# Patient Record
Sex: MALE | Race: WHITE | Employment: OTHER | URBAN - METROPOLITAN AREA
[De-identification: names, ages, dates, MRNs, and addresses within clinical notes are randomized per-mention and may not be internally consistent; named-entity substitution may affect disease eponyms.]

---

## 2017-05-11 ENCOUNTER — OFFICE VISIT (OUTPATIENT)
Dept: FAMILY MEDICINE CLINIC | Age: 67
End: 2017-05-11

## 2017-05-11 VITALS
HEIGHT: 69 IN | RESPIRATION RATE: 16 BRPM | HEART RATE: 68 BPM | DIASTOLIC BLOOD PRESSURE: 80 MMHG | BODY MASS INDEX: 29.24 KG/M2 | WEIGHT: 197.4 LBS | SYSTOLIC BLOOD PRESSURE: 136 MMHG

## 2017-05-11 DIAGNOSIS — E03.9 HYPOTHYROIDISM, UNSPECIFIED TYPE: ICD-10-CM

## 2017-05-11 DIAGNOSIS — Z98.890 S/P MVR (MITRAL VALVE REPAIR): ICD-10-CM

## 2017-05-11 DIAGNOSIS — Z12.11 SCREEN FOR COLON CANCER: ICD-10-CM

## 2017-05-11 DIAGNOSIS — E78.5 HYPERLIPIDEMIA, UNSPECIFIED HYPERLIPIDEMIA TYPE: ICD-10-CM

## 2017-05-11 DIAGNOSIS — I10 ESSENTIAL HYPERTENSION: ICD-10-CM

## 2017-05-11 DIAGNOSIS — Z23 NEED FOR VACCINATION WITH 13-POLYVALENT PNEUMOCOCCAL CONJUGATE VACCINE: ICD-10-CM

## 2017-05-11 DIAGNOSIS — Z12.5 SCREENING FOR PROSTATE CANCER: ICD-10-CM

## 2017-05-11 DIAGNOSIS — Z90.5 HISTORY OF NEPHRECTOMY: ICD-10-CM

## 2017-05-11 DIAGNOSIS — R73.01 IFG (IMPAIRED FASTING GLUCOSE): ICD-10-CM

## 2017-05-11 DIAGNOSIS — B02.29 PHN (POSTHERPETIC NEURALGIA): Primary | ICD-10-CM

## 2017-05-11 PROCEDURE — 3017F COLORECTAL CA SCREEN DOC REV: CPT | Performed by: FAMILY MEDICINE

## 2017-05-11 PROCEDURE — 90670 PCV13 VACCINE IM: CPT | Performed by: FAMILY MEDICINE

## 2017-05-11 PROCEDURE — 99214 OFFICE O/P EST MOD 30 MIN: CPT | Performed by: FAMILY MEDICINE

## 2017-05-11 PROCEDURE — 1123F ACP DISCUSS/DSCN MKR DOCD: CPT | Performed by: FAMILY MEDICINE

## 2017-05-11 PROCEDURE — G8420 CALC BMI NORM PARAMETERS: HCPCS | Performed by: FAMILY MEDICINE

## 2017-05-11 PROCEDURE — G0009 ADMIN PNEUMOCOCCAL VACCINE: HCPCS | Performed by: FAMILY MEDICINE

## 2017-05-11 PROCEDURE — 4040F PNEUMOC VAC/ADMIN/RCVD: CPT | Performed by: FAMILY MEDICINE

## 2017-05-11 PROCEDURE — 1036F TOBACCO NON-USER: CPT | Performed by: FAMILY MEDICINE

## 2017-05-11 PROCEDURE — G8427 DOCREV CUR MEDS BY ELIG CLIN: HCPCS | Performed by: FAMILY MEDICINE

## 2017-05-11 ASSESSMENT — ENCOUNTER SYMPTOMS
GASTROINTESTINAL NEGATIVE: 1
RESPIRATORY NEGATIVE: 1

## 2017-05-11 ASSESSMENT — PATIENT HEALTH QUESTIONNAIRE - PHQ9
2. FEELING DOWN, DEPRESSED OR HOPELESS: 0
SUM OF ALL RESPONSES TO PHQ9 QUESTIONS 1 & 2: 0
1. LITTLE INTEREST OR PLEASURE IN DOING THINGS: 0
SUM OF ALL RESPONSES TO PHQ QUESTIONS 1-9: 0

## 2017-05-16 DIAGNOSIS — E03.8 OTHER SPECIFIED HYPOTHYROIDISM: Primary | ICD-10-CM

## 2017-05-16 RX ORDER — LEVOTHYROXINE SODIUM 100 MCG
100 TABLET ORAL DAILY
Qty: 90 TABLET | Refills: 0 | Status: SHIPPED | OUTPATIENT
Start: 2017-05-16 | End: 2017-05-16

## 2017-06-09 RX ORDER — LEVOTHYROXINE SODIUM 88 MCG
TABLET ORAL
Qty: 90 TABLET | Refills: 2 | Status: SHIPPED | OUTPATIENT
Start: 2017-06-09 | End: 2018-03-06 | Stop reason: SDUPTHER

## 2017-09-01 RX ORDER — AMLODIPINE BESYLATE 2.5 MG/1
TABLET ORAL
Qty: 90 TABLET | Refills: 3 | Status: SHIPPED | OUTPATIENT
Start: 2017-09-01 | End: 2018-08-27 | Stop reason: SDUPTHER

## 2017-09-08 ENCOUNTER — OFFICE VISIT (OUTPATIENT)
Dept: FAMILY MEDICINE CLINIC | Age: 67
End: 2017-09-08
Payer: MEDICARE

## 2017-09-08 VITALS
BODY MASS INDEX: 28.29 KG/M2 | RESPIRATION RATE: 20 BRPM | SYSTOLIC BLOOD PRESSURE: 128 MMHG | DIASTOLIC BLOOD PRESSURE: 68 MMHG | TEMPERATURE: 97.8 F | WEIGHT: 191 LBS | HEART RATE: 76 BPM | HEIGHT: 69 IN

## 2017-09-08 DIAGNOSIS — J06.9 ACUTE URI: Primary | ICD-10-CM

## 2017-09-08 PROCEDURE — 1123F ACP DISCUSS/DSCN MKR DOCD: CPT | Performed by: FAMILY MEDICINE

## 2017-09-08 PROCEDURE — 99213 OFFICE O/P EST LOW 20 MIN: CPT | Performed by: FAMILY MEDICINE

## 2017-09-08 PROCEDURE — 4040F PNEUMOC VAC/ADMIN/RCVD: CPT | Performed by: FAMILY MEDICINE

## 2017-09-08 PROCEDURE — 1036F TOBACCO NON-USER: CPT | Performed by: FAMILY MEDICINE

## 2017-09-08 PROCEDURE — G8417 CALC BMI ABV UP PARAM F/U: HCPCS | Performed by: FAMILY MEDICINE

## 2017-09-08 PROCEDURE — 3017F COLORECTAL CA SCREEN DOC REV: CPT | Performed by: FAMILY MEDICINE

## 2017-09-08 PROCEDURE — G8427 DOCREV CUR MEDS BY ELIG CLIN: HCPCS | Performed by: FAMILY MEDICINE

## 2017-09-08 RX ORDER — BENZONATATE 100 MG/1
100 CAPSULE ORAL 3 TIMES DAILY PRN
Qty: 20 CAPSULE | Refills: 0 | Status: SHIPPED | OUTPATIENT
Start: 2017-09-08 | End: 2018-06-25 | Stop reason: ALTCHOICE

## 2017-09-08 RX ORDER — HYDROCODONE POLISTIREX AND CHLORPHENIRAMINE POLISTIREX 10; 8 MG/5ML; MG/5ML
5 SUSPENSION, EXTENDED RELEASE ORAL EVERY 12 HOURS PRN
Qty: 115 ML | Refills: 0 | Status: SHIPPED | OUTPATIENT
Start: 2017-09-08 | End: 2017-12-19

## 2017-09-08 ASSESSMENT — ENCOUNTER SYMPTOMS
COUGH: 1
RHINORRHEA: 1
SHORTNESS OF BREATH: 0
WHEEZING: 0
SINUS PRESSURE: 0
GASTROINTESTINAL NEGATIVE: 1

## 2017-11-13 RX ORDER — ATORVASTATIN CALCIUM 10 MG/1
TABLET, FILM COATED ORAL
Qty: 90 TABLET | Refills: 3 | Status: SHIPPED | OUTPATIENT
Start: 2017-11-13 | End: 2018-11-08 | Stop reason: SDUPTHER

## 2017-11-13 RX ORDER — FENOFIBRATE 145 MG/1
TABLET, COATED ORAL
Qty: 90 TABLET | Refills: 3 | Status: SHIPPED | OUTPATIENT
Start: 2017-11-13 | End: 2018-11-08 | Stop reason: SDUPTHER

## 2017-11-28 ENCOUNTER — HOSPITAL ENCOUNTER (OUTPATIENT)
Age: 67
Discharge: HOME | End: 2017-11-28
Payer: MEDICARE

## 2017-11-28 ENCOUNTER — TELEPHONE (OUTPATIENT)
Dept: CARDIOLOGY CLINIC | Age: 67
End: 2017-11-28

## 2017-11-28 ENCOUNTER — OFFICE VISIT (OUTPATIENT)
Dept: SURGERY | Age: 67
End: 2017-11-28
Payer: MEDICARE

## 2017-11-28 VITALS
HEART RATE: 76 BPM | OXYGEN SATURATION: 94 % | TEMPERATURE: 96.4 F | HEIGHT: 70 IN | WEIGHT: 185 LBS | DIASTOLIC BLOOD PRESSURE: 82 MMHG | SYSTOLIC BLOOD PRESSURE: 130 MMHG | BODY MASS INDEX: 26.48 KG/M2 | RESPIRATION RATE: 16 BRPM

## 2017-11-28 DIAGNOSIS — R19.09 GROIN SWELLING: ICD-10-CM

## 2017-11-28 DIAGNOSIS — I10 ESSENTIAL HYPERTENSION: ICD-10-CM

## 2017-11-28 DIAGNOSIS — Z98.890 S/P MVR (MITRAL VALVE REPAIR): ICD-10-CM

## 2017-11-28 DIAGNOSIS — I10 ESSENTIAL HYPERTENSION: Primary | ICD-10-CM

## 2017-11-28 DIAGNOSIS — Z01.818 PREOPERATIVE CLEARANCE: ICD-10-CM

## 2017-11-28 DIAGNOSIS — R10.30 INGUINAL PAIN, UNSPECIFIED LATERALITY: Primary | ICD-10-CM

## 2017-11-28 DIAGNOSIS — N50.89 SCROTAL MASS: ICD-10-CM

## 2017-11-28 DIAGNOSIS — Z01.818 PRE-OP TESTING: ICD-10-CM

## 2017-11-28 LAB
ANION GAP SERPL CALCULATED.3IONS-SCNC: 11 MEQ/L (ref 8–16)
BUN BLDV-MCNC: 21 MG/DL (ref 7–22)
CALCIUM SERPL-MCNC: 10 MG/DL (ref 8.5–10.5)
CHLORIDE BLD-SCNC: 99 MEQ/L (ref 98–111)
CO2: 31 MEQ/L (ref 23–33)
CREAT SERPL-MCNC: 1.2 MG/DL (ref 0.4–1.2)
EKG ATRIAL RATE: 71 BPM
EKG P AXIS: 50 DEGREES
EKG P-R INTERVAL: 192 MS
EKG Q-T INTERVAL: 380 MS
EKG QRS DURATION: 112 MS
EKG QTC CALCULATION (BAZETT): 412 MS
EKG R AXIS: -33 DEGREES
EKG T AXIS: 37 DEGREES
EKG VENTRICULAR RATE: 71 BPM
GFR SERPL CREATININE-BSD FRML MDRD: 60 ML/MIN/1.73M2
GLUCOSE BLD-MCNC: 91 MG/DL (ref 70–108)
HCT VFR BLD CALC: 48.1 % (ref 42–52)
HEMOGLOBIN: 16.1 GM/DL (ref 14–18)
POTASSIUM SERPL-SCNC: 4.7 MEQ/L (ref 3.5–5.2)
SODIUM BLD-SCNC: 141 MEQ/L (ref 135–145)

## 2017-11-28 PROCEDURE — G8484 FLU IMMUNIZE NO ADMIN: HCPCS | Performed by: SURGERY

## 2017-11-28 PROCEDURE — 1036F TOBACCO NON-USER: CPT | Performed by: SURGERY

## 2017-11-28 PROCEDURE — 1123F ACP DISCUSS/DSCN MKR DOCD: CPT | Performed by: SURGERY

## 2017-11-28 PROCEDURE — G8417 CALC BMI ABV UP PARAM F/U: HCPCS | Performed by: SURGERY

## 2017-11-28 PROCEDURE — 36415 COLL VENOUS BLD VENIPUNCTURE: CPT

## 2017-11-28 PROCEDURE — 80048 BASIC METABOLIC PNL TOTAL CA: CPT

## 2017-11-28 PROCEDURE — 99214 OFFICE O/P EST MOD 30 MIN: CPT | Performed by: SURGERY

## 2017-11-28 PROCEDURE — G8427 DOCREV CUR MEDS BY ELIG CLIN: HCPCS | Performed by: SURGERY

## 2017-11-28 PROCEDURE — 93005 ELECTROCARDIOGRAM TRACING: CPT

## 2017-11-28 PROCEDURE — 4040F PNEUMOC VAC/ADMIN/RCVD: CPT | Performed by: SURGERY

## 2017-11-28 PROCEDURE — 85018 HEMOGLOBIN: CPT

## 2017-11-28 PROCEDURE — 3017F COLORECTAL CA SCREEN DOC REV: CPT | Performed by: SURGERY

## 2017-11-28 PROCEDURE — 85014 HEMATOCRIT: CPT

## 2017-11-29 ASSESSMENT — ENCOUNTER SYMPTOMS
ABDOMINAL DISTENTION: 0
SHORTNESS OF BREATH: 0
CHOKING: 0
COUGH: 0
STRIDOR: 0
ABDOMINAL PAIN: 0
SINUS PRESSURE: 0
COLOR CHANGE: 0
EYE PAIN: 0
CHEST TIGHTNESS: 0
PHOTOPHOBIA: 0
APNEA: 0
CONSTIPATION: 0
WHEEZING: 0
VOICE CHANGE: 0
EYE DISCHARGE: 0
ANAL BLEEDING: 0
FACIAL SWELLING: 0
SINUS PAIN: 0
BACK PAIN: 0
RHINORRHEA: 0
TROUBLE SWALLOWING: 0
EYE REDNESS: 0
EYE ITCHING: 0
BLOOD IN STOOL: 0
SORE THROAT: 0

## 2017-11-29 NOTE — PROGRESS NOTES
Skin: Negative for color change, pallor, rash and wound. Allergic/Immunologic: Negative for environmental allergies, food allergies and immunocompromised state. Neurological: Negative for dizziness, tremors, seizures, syncope, facial asymmetry, speech difficulty, weakness, light-headedness, numbness and headaches. Hematological: Negative for adenopathy. Does not bruise/bleed easily. Psychiatric/Behavioral: Negative for agitation, behavioral problems, confusion, decreased concentration, dysphoric mood, hallucinations, self-injury, sleep disturbance and suicidal ideas. The patient is not nervous/anxious and is not hyperactive. Objective:   /82 (Site: Left Arm, Position: Sitting, Cuff Size: Medium Adult)   Pulse 76   Temp 96.4 °F (35.8 °C) (Tympanic)   Resp 16   Ht 5' 10\" (1.778 m)   Wt 185 lb (83.9 kg)   SpO2 94%   BMI 26.54 kg/m²     Physical Exam   Constitutional: He is oriented to person, place, and time. He appears well-developed and well-nourished. HENT:   Head: Normocephalic and atraumatic. Eyes: Conjunctivae and EOM are normal. Pupils are equal, round, and reactive to light. Left eye exhibits no discharge. No scleral icterus. Neck: No tracheal deviation present. No thyromegaly present. Cardiovascular: Normal rate, regular rhythm and normal heart sounds. Exam reveals no gallop and no friction rub. No murmur heard. Pulmonary/Chest: Effort normal and breath sounds normal. No respiratory distress. He has no wheezes. He has no rales. He exhibits no tenderness. Abdominal: He exhibits no distension. There is no tenderness. There is no rebound and no guarding. Genitourinary:         Musculoskeletal: Normal range of motion. He exhibits no edema or tenderness. Lymphadenopathy:     He has no cervical adenopathy. Neurological: He is alert and oriented to person, place, and time. Skin: Skin is warm and dry. No rash noted. No erythema. No pallor.    Psychiatric: He has a normal mood and affect.  His behavior is normal. Judgment and thought content normal.       Results for orders placed or performed during the hospital encounter of 05/16/17   Comprehensive Metabolic Panel   Result Value Ref Range    Glucose 104 70 - 108 mg/dl    CREATININE 1.5 (H) 0.4 - 1.2 mg/dl    BUN 19 7 - 22 mg/dl    Sodium 141 135 - 145 meq/l    Potassium 4.3 3.5 - 5.2 meq/l    Chloride 100 98 - 111 meq/l    CO2 28 23 - 33 meq/l    Calcium 9.8 8.5 - 10.5 mg/dl    AST 34 5 - 40 U/L    Alkaline Phosphatase 92 38 - 126 U/L    Total Protein 7.6 6.1 - 8.0 gm/dl    Alb 4.3 3.5 - 5.1 gm/dl    Total Bilirubin 0.8 0.3 - 1.2 mg/dl    ALT 48 11 - 66 U/L   Lipid Panel   Result Value Ref Range    Cholesterol, Total 222 (H) 100 - 199 mg/dl    Triglycerides 223 (H) 0 - 199 mg/dl    HDL 43 mg/dl    LDL Calculated 134 mg/dl   Hemoglobin A1C   Result Value Ref Range    Hemoglobin A1C 6.2 4.4 - 6.4 %    AVERAGE GLUCOSE 126 70 - 126 mg/dl   TSH with Reflex   Result Value Ref Range    TSH 6.610 (H) 0.400 - 4.20 mcIU/ml   PSA, Prostatic Specific Antigen   Result Value Ref Range    PSA 0.39 0.00 - 2.50 ng/ml   CBC Auto Differential   Result Value Ref Range    WBC 6.3 4.8 - 10.8 thou/mm3    RBC 5.05 4.70 - 6.10 mill/mm3    Hemoglobin 15.3 14.0 - 18.0 gm/dl    Hematocrit 46.5 42.0 - 52.0 %    MCV 92.1 80.0 - 94.0 fL    MCH 30.2 27.0 - 31.0 pg    MCHC 32.8 (L) 33.0 - 37.0 gm/dl    RDW 14.1 11.5 - 14.5 %    Platelets 193 728 - 083 thou/mm3    MPV 10.2 7.4 - 10.4 mcm    RBC Morphology NORMAL     Seg Neutrophils 54.6 %    Lymphocytes 27.4 %    Monocytes 11.4 %    Eosinophils 6.0 %    Basophils 0.6 %    nRBC 0 /100 wbc    Segs Absolute 3.4 1.8 - 7.7 thou/mm3    Lymphocytes # 1.7 1.0 - 4.8 thou/mm3    Monocytes # 0.7 0.4 - 1.3 thou/mm3    Eosinophils # 0.4 0.0 - 0.4 thou/mm3    Basophils # 0.0 0.0 - 0.1 thou/mm3   Anion Gap   Result Value Ref Range    Anion Gap 13.0 8.0 - 16.0 meq/l   Glomerular Filtration Rate, Estimated   Result Value Ref Range    Est, Glom Filt Rate 46 (A) ml/min/1.73m2   T4, Free   Result Value Ref Range    T4 Free 1.19 0.93 - 1.76 ng/dl       Assessment:     Unusual mass of the left lower aspect of the scrotum of uncertain etiology it almost appears to be a lipoma but I never seen anything quite like this in this area it is clearly not a recurrent hernia however the patient is having some problems and would like to have it removed discussed with the patient that this would have to be removed under a general anesthetic he would like to proceed    Plan:     1. Schedule Drake for excision of left hoang-scrotal mass  2. The risks, benefits and alternatives were discussed with Angelica Chopra. He understands and wishes to proceed with surgical intervention. 3. Restrictions discussed with Angelica Chopra and he expresses understanding. 4. He is advised to call back directly if there are further questions/concerns, or if his symptoms worsen prior to surgery.             Electronically signed by Ham Del Rio MD on 11/29/2017 at 12:08 AM

## 2017-12-01 ENCOUNTER — HOSPITAL ENCOUNTER (OUTPATIENT)
Dept: NON INVASIVE DIAGNOSTICS | Age: 67
Discharge: HOME | End: 2017-12-01
Payer: MEDICARE

## 2017-12-01 VITALS — HEIGHT: 69 IN | BODY MASS INDEX: 27.4 KG/M2 | WEIGHT: 185 LBS

## 2017-12-01 DIAGNOSIS — Z01.818 PREOPERATIVE CLEARANCE: ICD-10-CM

## 2017-12-01 DIAGNOSIS — Z98.890 S/P MVR (MITRAL VALVE REPAIR): ICD-10-CM

## 2017-12-01 DIAGNOSIS — I10 ESSENTIAL HYPERTENSION: ICD-10-CM

## 2017-12-01 PROCEDURE — 6360000002 HC RX W HCPCS

## 2017-12-01 PROCEDURE — 78452 HT MUSCLE IMAGE SPECT MULT: CPT

## 2017-12-01 PROCEDURE — 93017 CV STRESS TEST TRACING ONLY: CPT | Performed by: INTERNAL MEDICINE

## 2017-12-01 PROCEDURE — A9500 TC99M SESTAMIBI: HCPCS | Performed by: INTERNAL MEDICINE

## 2017-12-01 PROCEDURE — 3430000000 HC RX DIAGNOSTIC RADIOPHARMACEUTICAL: Performed by: INTERNAL MEDICINE

## 2017-12-01 RX ADMIN — Medication 9.6 MILLICURIE: at 07:35

## 2017-12-01 RX ADMIN — Medication 33.2 MILLICURIE: at 08:40

## 2017-12-04 ENCOUNTER — TELEPHONE (OUTPATIENT)
Dept: SURGERY | Age: 67
End: 2017-12-04

## 2017-12-07 NOTE — PROGRESS NOTES
NPO after midnight  [employer] and drivers license  Wear comfortable clean clothing  Do not bring jewelry or valuables  Shower night before and morning of surgery with a liquid antibacterial soap  Bring list of medications with dosage and how often taken  Follow all instructions given by your physician   needed at discharge    In preparation for their surgical procedure above patient was screened for Obstructive Sleep Apnea (AMBER) using the STOP-Bang Questionnaire by the [place] [address] department. This is a pre-surgical screening tool for patient safety and serves as a recommendation, this WILL NOT cause cancellation of surgery. STOP-Bang Questionnaire  * Do you currently see a pulmonologist?  No     If yes STOP, do not complete. Patient follows with Dr. .    1. Do you snore loudly (able to be heard in the next room)? No    2. Do you often feel tired or sleepy during the daytime? No       3. Has anyone ever told you that you stop breathing during your sleep? No    4. Do you have or are you being treated for high blood pressure? Yes      5. BMI more than 35? BMI (Calculated): 27.4        No    6. Age over 48 years? 69 y.o. Yes    7. Neck Circumference greater than 17 inches for male or 16 inches for female? Measured           (visits only)            Not Applicable    8. Gender Male? Yes      TOTAL SCORE: 3    AMBER - Low Risk : Yes to 0 - 2 questions  AMBER - Intermediate Risk : Yes to 3 - 4 questions  AMBER - High Risk : Yes to 5 - 8 questions    Adapted from:   STOP Questionnaire: A Tool to Screen Patients for Obstructive Sleep Apnea   ROSELYN RodriguezC.P.C., ANABELA Caraballo.B.B.S., Jay Leos M.D., Segun Jansen. Jeet Recinos, Ph.D., Shawna Davila M.B.B.S., Morro Celaya, M.Sc., Art Tyson M.D., Gladys Burris. ALANA Lawson.P.C.    Anesthesiology 2008; 935:764-85 Copyright 2008, the [address] [employer] [place] [address].   ----------------------------------------------------------------------------------------------------------------

## 2017-12-08 ENCOUNTER — ANESTHESIA EVENT (OUTPATIENT)
Dept: OPERATING ROOM | Age: 67
End: 2017-12-08
Payer: MEDICARE

## 2017-12-08 ENCOUNTER — HOSPITAL ENCOUNTER (OUTPATIENT)
Age: 67
Discharge: HOME | End: 2017-12-08
Attending: SURGERY | Admitting: SURGERY
Payer: MEDICARE

## 2017-12-08 ENCOUNTER — ANESTHESIA (OUTPATIENT)
Dept: OPERATING ROOM | Age: 67
End: 2017-12-08
Payer: MEDICARE

## 2017-12-08 VITALS
SYSTOLIC BLOOD PRESSURE: 124 MMHG | BODY MASS INDEX: 28.11 KG/M2 | HEIGHT: 69 IN | OXYGEN SATURATION: 94 % | HEART RATE: 81 BPM | RESPIRATION RATE: 14 BRPM | TEMPERATURE: 98 F | WEIGHT: 189.8 LBS | DIASTOLIC BLOOD PRESSURE: 60 MMHG

## 2017-12-08 VITALS
TEMPERATURE: 97.7 F | DIASTOLIC BLOOD PRESSURE: 65 MMHG | OXYGEN SATURATION: 98 % | RESPIRATION RATE: 14 BRPM | SYSTOLIC BLOOD PRESSURE: 108 MMHG

## 2017-12-08 PROBLEM — N50.89 SCROTAL MASS: Status: ACTIVE | Noted: 2017-12-08

## 2017-12-08 PROCEDURE — 2580000003 HC RX 258

## 2017-12-08 PROCEDURE — 3700000000 HC ANESTHESIA ATTENDED CARE: Performed by: SURGERY

## 2017-12-08 PROCEDURE — 7100000011 HC PHASE II RECOVERY - ADDTL 15 MIN: Performed by: SURGERY

## 2017-12-08 PROCEDURE — 7100000010 HC PHASE II RECOVERY - FIRST 15 MIN: Performed by: SURGERY

## 2017-12-08 PROCEDURE — 7100000000 HC PACU RECOVERY - FIRST 15 MIN: Performed by: SURGERY

## 2017-12-08 PROCEDURE — 6360000002 HC RX W HCPCS: Performed by: NURSE ANESTHETIST, CERTIFIED REGISTERED

## 2017-12-08 PROCEDURE — 88304 TISSUE EXAM BY PATHOLOGIST: CPT

## 2017-12-08 PROCEDURE — 2500000003 HC RX 250 WO HCPCS: Performed by: SURGERY

## 2017-12-08 PROCEDURE — 7100000001 HC PACU RECOVERY - ADDTL 15 MIN: Performed by: SURGERY

## 2017-12-08 PROCEDURE — 3700000001 HC ADD 15 MINUTES (ANESTHESIA): Performed by: SURGERY

## 2017-12-08 PROCEDURE — 3600000012 HC SURGERY LEVEL 2 ADDTL 15MIN: Performed by: SURGERY

## 2017-12-08 PROCEDURE — 2500000003 HC RX 250 WO HCPCS: Performed by: NURSE ANESTHETIST, CERTIFIED REGISTERED

## 2017-12-08 PROCEDURE — 2580000003 HC RX 258: Performed by: NURSE ANESTHETIST, CERTIFIED REGISTERED

## 2017-12-08 PROCEDURE — 11426 EXC H-F-NK-SP B9+MARG >4 CM: CPT | Performed by: SURGERY

## 2017-12-08 PROCEDURE — 3600000002 HC SURGERY LEVEL 2 BASE: Performed by: SURGERY

## 2017-12-08 RX ORDER — PROPOFOL 10 MG/ML
INJECTION, EMULSION INTRAVENOUS PRN
Status: DISCONTINUED | OUTPATIENT
Start: 2017-12-08 | End: 2017-12-08 | Stop reason: SDUPTHER

## 2017-12-08 RX ORDER — ONDANSETRON 2 MG/ML
INJECTION INTRAMUSCULAR; INTRAVENOUS PRN
Status: DISCONTINUED | OUTPATIENT
Start: 2017-12-08 | End: 2017-12-08 | Stop reason: SDUPTHER

## 2017-12-08 RX ORDER — FENTANYL CITRATE 50 UG/ML
INJECTION, SOLUTION INTRAMUSCULAR; INTRAVENOUS PRN
Status: DISCONTINUED | OUTPATIENT
Start: 2017-12-08 | End: 2017-12-08 | Stop reason: SDUPTHER

## 2017-12-08 RX ORDER — DEXAMETHASONE SODIUM PHOSPHATE 4 MG/ML
INJECTION, SOLUTION INTRA-ARTICULAR; INTRALESIONAL; INTRAMUSCULAR; INTRAVENOUS; SOFT TISSUE PRN
Status: DISCONTINUED | OUTPATIENT
Start: 2017-12-08 | End: 2017-12-08 | Stop reason: SDUPTHER

## 2017-12-08 RX ORDER — OXYCODONE HYDROCHLORIDE AND ACETAMINOPHEN 5; 325 MG/1; MG/1
1 TABLET ORAL EVERY 6 HOURS PRN
Qty: 20 TABLET | Refills: 0 | Status: SHIPPED | OUTPATIENT
Start: 2017-12-08 | End: 2017-12-19

## 2017-12-08 RX ORDER — SODIUM CHLORIDE 9 MG/ML
INJECTION, SOLUTION INTRAVENOUS CONTINUOUS
Status: DISCONTINUED | OUTPATIENT
Start: 2017-12-08 | End: 2017-12-08 | Stop reason: HOSPADM

## 2017-12-08 RX ORDER — LIDOCAINE HYDROCHLORIDE 20 MG/ML
INJECTION, SOLUTION INFILTRATION; PERINEURAL PRN
Status: DISCONTINUED | OUTPATIENT
Start: 2017-12-08 | End: 2017-12-08 | Stop reason: SDUPTHER

## 2017-12-08 RX ORDER — BUPIVACAINE HYDROCHLORIDE AND EPINEPHRINE 5; 5 MG/ML; UG/ML
INJECTION, SOLUTION EPIDURAL; INTRACAUDAL; PERINEURAL PRN
Status: DISCONTINUED | OUTPATIENT
Start: 2017-12-08 | End: 2017-12-08 | Stop reason: HOSPADM

## 2017-12-08 RX ORDER — SODIUM CHLORIDE 9 MG/ML
INJECTION, SOLUTION INTRAVENOUS CONTINUOUS PRN
Status: DISCONTINUED | OUTPATIENT
Start: 2017-12-08 | End: 2017-12-08 | Stop reason: SDUPTHER

## 2017-12-08 RX ORDER — PROMETHAZINE HYDROCHLORIDE 25 MG/ML
12.5 INJECTION, SOLUTION INTRAMUSCULAR; INTRAVENOUS
Status: DISCONTINUED | OUTPATIENT
Start: 2017-12-08 | End: 2017-12-08 | Stop reason: HOSPADM

## 2017-12-08 RX ORDER — LABETALOL HYDROCHLORIDE 5 MG/ML
10 INJECTION, SOLUTION INTRAVENOUS EVERY 10 MIN PRN
Status: DISCONTINUED | OUTPATIENT
Start: 2017-12-08 | End: 2017-12-08 | Stop reason: HOSPADM

## 2017-12-08 RX ADMIN — SODIUM CHLORIDE: 9 INJECTION, SOLUTION INTRAVENOUS at 11:46

## 2017-12-08 RX ADMIN — DEXAMETHASONE SODIUM PHOSPHATE 4 MG: 4 INJECTION, SOLUTION INTRAMUSCULAR; INTRAVENOUS at 11:55

## 2017-12-08 RX ADMIN — FENTANYL CITRATE 50 MCG: 50 INJECTION INTRAMUSCULAR; INTRAVENOUS at 11:51

## 2017-12-08 RX ADMIN — FENTANYL CITRATE 50 MCG: 50 INJECTION INTRAMUSCULAR; INTRAVENOUS at 12:03

## 2017-12-08 RX ADMIN — PROPOFOL 150 MG: 10 INJECTION, EMULSION INTRAVENOUS at 11:51

## 2017-12-08 RX ADMIN — LIDOCAINE HYDROCHLORIDE 50 MG: 20 INJECTION, SOLUTION INFILTRATION; PERINEURAL at 11:51

## 2017-12-08 RX ADMIN — SODIUM CHLORIDE: 9 INJECTION, SOLUTION INTRAVENOUS at 10:26

## 2017-12-08 RX ADMIN — ONDANSETRON 4 MG: 2 INJECTION INTRAMUSCULAR; INTRAVENOUS at 12:25

## 2017-12-08 ASSESSMENT — PULMONARY FUNCTION TESTS
PIF_VALUE: 4
PIF_VALUE: 5
PIF_VALUE: 4
PIF_VALUE: 18
PIF_VALUE: 20
PIF_VALUE: 4
PIF_VALUE: 4
PIF_VALUE: 6
PIF_VALUE: 4
PIF_VALUE: 5
PIF_VALUE: 23
PIF_VALUE: 5
PIF_VALUE: 5
PIF_VALUE: 7
PIF_VALUE: 5
PIF_VALUE: 4
PIF_VALUE: 2
PIF_VALUE: 4
PIF_VALUE: 3
PIF_VALUE: 3
PIF_VALUE: 2
PIF_VALUE: 3
PIF_VALUE: 5
PIF_VALUE: 24
PIF_VALUE: 3
PIF_VALUE: 2
PIF_VALUE: 17
PIF_VALUE: 5
PIF_VALUE: 26
PIF_VALUE: 20
PIF_VALUE: 2
PIF_VALUE: 0
PIF_VALUE: 0
PIF_VALUE: 4
PIF_VALUE: 4
PIF_VALUE: 2
PIF_VALUE: 21
PIF_VALUE: 5
PIF_VALUE: 3
PIF_VALUE: 21
PIF_VALUE: 4
PIF_VALUE: 3

## 2017-12-08 ASSESSMENT — PAIN SCALES - GENERAL
PAINLEVEL_OUTOF10: 0
PAINLEVEL_OUTOF10: 4
PAINLEVEL_OUTOF10: 4

## 2017-12-08 ASSESSMENT — PAIN - FUNCTIONAL ASSESSMENT: PAIN_FUNCTIONAL_ASSESSMENT: 0-10

## 2017-12-08 NOTE — H&P
[facility] PROFESSIONAL SERVS  [facility]  [address]  [address] [address]  Dept: [phone]  Dept Fax: [phone]  Loc: [phone]    Visit Date: 11/28/2017    Kathi Hurtado is a 69 y.o. male who presents today for:  Chief Complaint   Patient presents with    Surgical Consult     Est patient-scrotal hernia        HPI:     HPI 76-year-old white male who appears younger than his age who has a history of right inguinal hernia repair ×1 and left in hernia repair ×2 patient is presenting today he faces a inguinal hernia recurrence were well down in his scrotum and is here for my evaluation he states it appears to be getting larger in size. He also admits it's been there for a few years really has no pain but discomfort and it is enlarged and is difficult when he walks he is here for my evaluation he denies any voiding abnormalities no pain in the true groin area    Past Medical History:   Diagnosis Date    CKD (chronic kidney disease) stage 3, GFR 30-59 ml/min     only one kidney     Hyperlipidemia     Hypertension     Mitral valve regurgitation     MOD-SVERE s/p valvuloplasty    Other sleep disturbances     Thyroid disease       Past Surgical History:   Procedure Laterality Date    CARDIAC CATHETERIZATION      CARDIOVASCULAR STRESS TEST  04/06/2010    no CP induced  by exercise. mild htn response to exercise. no arrhythmias. no EKG chg. no wall motion abnormalities. abnormal MV structure w/MR unchanged.     [facility]  [facility]    [facility]    CHOLECYSTECTOMY      COLONOSCOPY      DIAGNOSTIC CARDIAC CATH LAB PROCEDURE      ENDOSCOPY, COLON, DIAGNOSTIC      HERNIA REPAIR  1-23-09    [facility]- left indirect and direct inguinal hernia    HERNIA REPAIR  2-5-07    [facility]     INGUINAL HERNIA REPAIR Left 11/3/2014    left hernia repair w mesh    KIDNEY DONATION  2005    MITRAL VALVE SURGERY  11-9-12    repair    TRANSTHORACIC normal mood and affect.  His behavior is normal. Judgment and thought content normal.       Results for orders placed or performed during the hospital encounter of 05/16/17   Comprehensive Metabolic Panel   Result Value Ref Range    Glucose 104 70 - 108 mg/dl    CREATININE 1.5 (H) 0.4 - 1.2 mg/dl    BUN 19 7 - 22 mg/dl    Sodium 141 135 - 145 meq/l    Potassium 4.3 3.5 - 5.2 meq/l    Chloride 100 98 - 111 meq/l    CO2 28 23 - 33 meq/l    Calcium 9.8 8.5 - 10.5 mg/dl    AST 34 5 - 40 U/L    Alkaline Phosphatase 92 38 - 126 U/L    Total Protein 7.6 6.1 - 8.0 gm/dl    Alb 4.3 3.5 - 5.1 gm/dl    Total Bilirubin 0.8 0.3 - 1.2 mg/dl    ALT 48 11 - 66 U/L   Lipid Panel   Result Value Ref Range    Cholesterol, Total 222 (H) 100 - 199 mg/dl    Triglycerides 223 (H) 0 - 199 mg/dl    HDL 43 mg/dl    LDL Calculated 134 mg/dl   Hemoglobin A1C   Result Value Ref Range    Hemoglobin A1C 6.2 4.4 - 6.4 %    AVERAGE GLUCOSE 126 70 - 126 mg/dl   TSH with Reflex   Result Value Ref Range    TSH 6.610 (H) 0.400 - 4.20 mcIU/ml   PSA, Prostatic Specific Antigen   Result Value Ref Range    PSA 0.39 0.00 - 2.50 ng/ml   CBC Auto Differential   Result Value Ref Range    WBC 6.3 4.8 - 10.8 thou/mm3    RBC 5.05 4.70 - 6.10 mill/mm3    Hemoglobin 15.3 14.0 - 18.0 gm/dl    Hematocrit 46.5 42.0 - 52.0 %    MCV 92.1 80.0 - 94.0 fL    MCH 30.2 27.0 - 31.0 pg    MCHC 32.8 (L) 33.0 - 37.0 gm/dl    RDW 14.1 11.5 - 14.5 %    Platelets 525 784 - 767 thou/mm3    MPV 10.2 7.4 - 10.4 mcm    RBC Morphology NORMAL     Seg Neutrophils 54.6 %    Lymphocytes 27.4 %    Monocytes 11.4 %    Eosinophils 6.0 %    Basophils 0.6 %    nRBC 0 /100 wbc    Segs Absolute 3.4 1.8 - 7.7 thou/mm3    Lymphocytes # 1.7 1.0 - 4.8 thou/mm3    Monocytes # 0.7 0.4 - 1.3 thou/mm3    Eosinophils # 0.4 0.0 - 0.4 thou/mm3    Basophils # 0.0 0.0 - 0.1 thou/mm3   Anion Gap   Result Value Ref Range    Anion Gap 13.0 8.0 - 16.0 meq/l   Glomerular Filtration Rate, Estimated   Result Value Ref Range    Est, Glom Filt Rate 46 (A) ml/min/1.73m2   T4, Free   Result Value Ref Range    T4 Free 1.19 0.93 - 1.76 ng/dl       Assessment:     Unusual mass of the left lower aspect of the scrotum of uncertain etiology it almost appears to be a lipoma but I never seen anything quite like this in this area it is clearly not a recurrent hernia however the patient is having some problems and would like to have it removed discussed with the patient that this would have to be removed under a general anesthetic he would like to proceed    Plan:     1. Schedule Drake for excision of left hoang-scrotal mass  2. The risks, benefits and alternatives were discussed with Franci Levine. He understands and wishes to proceed with surgical intervention. 3. Restrictions discussed with Franci Levine and he expresses understanding. 4. He is advised to call back directly if there are further questions/concerns, or if his symptoms worsen prior to surgery.             Electronically signed by Benito Recinos MD on 11/29/2017 at 12:08 AM

## 2017-12-08 NOTE — ANESTHESIA POSTPROCEDURE EVALUATION
complications    Additional Follow-Up / Treatment / Comment:  None    Parmjit Rayo DO  December 8, 2017   12:58 PM

## 2017-12-08 NOTE — ANESTHESIA PRE PROCEDURE
Department of Anesthesiology  Preprocedure Note       Name:  Laura Krishnamurthy   Age:  69 y.o.  :  1950                                          MRN:  [id number]         Date:  2017      Surgeon: Michael Weaver):  Dorothea Victor MD    Procedure: Procedure(s):  EXCISION OF LEFT DOTTIE SCROTAL MASS    Medications prior to admission:   Prior to Admission medications    Medication Sig Start Date End Date Taking? Authorizing Provider   atorvastatin (LIPITOR) 10 MG tablet TAKE 1 TABLET DAILY 17  Yes Kiet Waite, DO   fenofibrate (TRICOR) 145 MG tablet TAKE 1 TABLET DAILY 17  Yes Kiet Waite, DO   benzonatate (TESSALON PERLES) 100 MG capsule Take 1 capsule by mouth 3 times daily as needed for Cough 17  Yes Kiet Waite, DO   amLODIPine (NORVASC) 2.5 MG tablet TAKE 1 TABLET DAILY 17  Yes Kiet Waite, DO   SYNTHROID 88 MCG tablet TAKE 1 TABLET DAILY WITH WATER ON AN EMPTY STOMACH. WAIT 30 MINUTES BEFORE EATING OR TAKING OTHER MEDICATIONS. 17  Yes Kiet Waite, DO   pantoprazole (PROTONIX) 40 MG tablet Take 1 tablet by mouth 2 times daily (before meals) 7/8/15  Yes Kiet Waite, DO   hydrocodone-chlorpheniramine [facility]) 10-8 MG/5ML SUER Take 5 mLs by mouth every 12 hours as needed (cough) . 17   Kiet Waite, DO   gabapentin (NEURONTIN) 800 MG tablet Take 1 tablet by mouth 3 times daily 16   Kiet Waite, DO   aspirin EC 81 MG EC tablet Take 1 tablet by mouth daily 7/8/15   Kiet Waite, DO       Current medications:    Current Facility-Administered Medications   Medication Dose Route Frequency Provider Last Rate Last Dose    0.9 % sodium chloride infusion   Intravenous Continuous Laura Edward RADHA 20 mL/hr at  1026         Allergies:     Allergies   Allergen Reactions    Tape Beau Regulus Tape] Dermatitis     Plastic tape       Problem List:    Patient Active Problem List   Diagnosis Code

## 2017-12-08 NOTE — PROGRESS NOTES
Pt ambulates to bathroom and urinates without difficulty. States pain is tolerable and he is ready to go home.

## 2017-12-09 NOTE — OP NOTE
Hemostasis was obtained with electrocautery. Interrupted 3-0 Vicryl was used to close the subcutaneous and staples were  used to close the skin. The patient tolerated the procedure well. DARIO FOX [facility] TREATMENT [facility], MD    D: 12/08/2017 13:15:29       T: 12/08/2017 13:18:00     [email or web address]  Job#: [id number]     Doc#: [id number]    CC:

## 2017-12-11 ENCOUNTER — TELEPHONE (OUTPATIENT)
Dept: SURGERY | Age: 67
End: 2017-12-11

## 2017-12-11 ENCOUNTER — TELEPHONE (OUTPATIENT)
Dept: FAMILY MEDICINE CLINIC | Age: 67
End: 2017-12-11

## 2017-12-19 ENCOUNTER — OFFICE VISIT (OUTPATIENT)
Dept: SURGERY | Age: 67
End: 2017-12-19

## 2017-12-19 VITALS
WEIGHT: 188.3 LBS | SYSTOLIC BLOOD PRESSURE: 140 MMHG | RESPIRATION RATE: 18 BRPM | HEART RATE: 85 BPM | TEMPERATURE: 96.8 F | DIASTOLIC BLOOD PRESSURE: 78 MMHG | HEIGHT: 70 IN | BODY MASS INDEX: 26.96 KG/M2 | OXYGEN SATURATION: 96 %

## 2017-12-19 DIAGNOSIS — Z98.890 S/P EXCISION OF LIPOMA: Primary | ICD-10-CM

## 2017-12-19 DIAGNOSIS — Z48.02 REMOVAL OF STAPLES: ICD-10-CM

## 2017-12-19 DIAGNOSIS — Z86.018 S/P EXCISION OF LIPOMA: Primary | ICD-10-CM

## 2017-12-19 PROCEDURE — 99024 POSTOP FOLLOW-UP VISIT: CPT | Performed by: NURSE PRACTITIONER

## 2017-12-19 ASSESSMENT — ENCOUNTER SYMPTOMS
PHOTOPHOBIA: 0
EYE DISCHARGE: 0
VOICE CHANGE: 0
EYE PAIN: 0
SORE THROAT: 0
BACK PAIN: 0
CHEST TIGHTNESS: 0
SINUS PRESSURE: 0
COLOR CHANGE: 0
SHORTNESS OF BREATH: 0
ANAL BLEEDING: 0
CHOKING: 0
NAUSEA: 0
EYE ITCHING: 0
VOMITING: 0
STRIDOR: 0
WHEEZING: 0
EYE REDNESS: 0
DIARRHEA: 0
RHINORRHEA: 0
FACIAL SWELLING: 0
TROUBLE SWALLOWING: 0
RECTAL PAIN: 0
CONSTIPATION: 0
COUGH: 0
ABDOMINAL PAIN: 0
APNEA: 0
ABDOMINAL DISTENTION: 0
BLOOD IN STOOL: 0

## 2017-12-19 NOTE — PROGRESS NOTES
[facility] PROFESSIONAL SERVS  [facility]  [address]  [address] [address]  Dept: [phone]  Dept Fax: [phone]  Loc: [phone]    Visit Date: 12/19/2017       Antony Holliday is a 69 y.o. male who presents today for:  Chief Complaint   Patient presents with    Post-Op Check     S/p Excision L Scrotal Lipoma 12/8/17       HPI:     Aris Neal presents today for a 11 day  post op check. Today He has no complaints at this time. He states very little pain since surgery. He has significant swelling in the scrotal sac. He has a rather large seroma in the scrotal sac. The fluid was hemolyzed blood and clear. The skin was prepped with betadine, a 18 gauge needle was inserted through the suture line with a 60ml syringe. Appx another 60ml oozed out of scrotum. Partial staples removed. Tolerated well. Pathology was reviewed and discussed. The patient is leaving for [place] for 4 months next week. He will need to find a provider or urgent care clinic to remove the remainder of the staples after January 1. Would like a follow up appointment when he returns from [place] in April. Past Medical History:   Diagnosis Date    CKD (chronic kidney disease) stage 3, GFR 30-59 ml/min     only one kidney     Hyperlipidemia     Hypertension     Mitral valve regurgitation     MOD-SVERE s/p valvuloplasty    Other sleep disturbances     Thyroid disease       Past Surgical History:   Procedure Laterality Date    CARDIAC CATHETERIZATION      CARDIOVASCULAR STRESS TEST  04/06/2010    no CP induced  by exercise. mild htn response to exercise. no arrhythmias. no EKG chg. no wall motion abnormalities. abnormal MV structure w/MR unchanged.     [facility]    CHOLECYSTECTOMY      COLONOSCOPY      DIAGNOSTIC CARDIAC CATH LAB PROCEDURE      DISSECTION GROIN N/A 12/8/2017    EXCISION OF LEFT DOTTIE SCROTAL MASS performed by Fam Taylor MD at [place] [facility]  ENDOSCOPY, COLON, DIAGNOSTIC      HERNIA REPAIR  1-23-09    [facility]- left indirect and direct inguinal hernia    HERNIA REPAIR  2-5-07    Right inguinal hernia- [facility]     INGUINAL HERNIA REPAIR Left 11/3/2014    left hernia repair w mesh    KIDNEY DONATION  2005    MITRAL VALVE SURGERY  11-9-12    repair    TRANSTHORACIC ECHOCARDIOGRAM  10/24/2006    EF 65%. LV size & systolic funct normal. no regional wall motion abnormalities. normal LV wall thickness. mildly enlarged  lt atrium at 4.2 cm. mild aortic sclerosis, no stenosis. severe prolapse of the posterior mitral leaflet. no  pericardial effusion. normal LV inflow. severe eccentric MR is anteriorly directed. mild TR. SPAP 39 mmHg.  TRANSTHORACIC ECHOCARDIOGRAM  03/29/2010    EF 55-65%. LV size & systolic funct normal. no regional wall motion abnormalities. MV moderate focal thickening of the posterior leaflet, involving the leaflet from base to margin. mild chordal involvement. valve morphology consistent w/myxomatous proliferation. holosystolic prolapse of the posterior leaflet. (9 mm). mod MR. mild TR.  TRANSTHORACIC ECHOCARDIOGRAM  02/07/2008    EF 65%. LV size & systolic funct normal. no segmental wall motion abnormalities. mild enlargement of lt atrium. mild AV sclerosis w/o stenosis. severe prolapse of the posterior leaflet of the MV. severe MR w/anteriorly directed jet. mild TR. no pericardial effusion. no thrombus or vegetation.      Family History   Problem Relation Age of Onset    Diabetes Father     Heart Disease Father     Stroke Father     Mental Illness Mother      alzheimiers     Social History   Substance Use Topics    Smoking status: Never Smoker    Smokeless tobacco: Never Used    Alcohol use 1.2 oz/week     2 Cans of beer per week      Comment: 2 ALCOHOLIC BEVERAGES WEEKLY        Current Outpatient Prescriptions   Medication Sig Dispense Refill    atorvastatin (LIPITOR) 10 MG tablet TAKE 1 TABLET DAILY 90 tablet 3    fenofibrate (TRICOR) 145 MG tablet TAKE 1 TABLET DAILY 90 tablet 3    benzonatate (TESSALON PERLES) 100 MG capsule Take 1 capsule by mouth 3 times daily as needed for Cough 20 capsule 0    amLODIPine (NORVASC) 2.5 MG tablet TAKE 1 TABLET DAILY 90 tablet 3    SYNTHROID 88 MCG tablet TAKE 1 TABLET DAILY WITH WATER ON AN EMPTY STOMACH. WAIT 30 MINUTES BEFORE EATING OR TAKING OTHER MEDICATIONS. 90 tablet 2    gabapentin (NEURONTIN) 800 MG tablet Take 1 tablet by mouth 3 times daily 270 tablet 3    pantoprazole (PROTONIX) 40 MG tablet Take 1 tablet by mouth 2 times daily (before meals) 90 tablet 3    aspirin EC 81 MG EC tablet Take 1 tablet by mouth daily 90 tablet 3     No current facility-administered medications for this visit. Allergies   Allergen Reactions    Tape Mario Alberto Memo Tape] Dermatitis     Plastic tape       Subjective:      Review of Systems   Constitutional: Negative for activity change, appetite change, chills, diaphoresis, fatigue, fever and unexpected weight change. HENT: Negative for congestion, dental problem, drooling, ear discharge, ear pain, facial swelling, hearing loss, mouth sores, nosebleeds, postnasal drip, rhinorrhea, sinus pressure, sneezing, sore throat, tinnitus, trouble swallowing and voice change. Eyes: Negative for photophobia, pain, discharge, redness, itching and visual disturbance. Respiratory: Negative for apnea, cough, choking, chest tightness, shortness of breath, wheezing and stridor. Cardiovascular: Negative for chest pain, palpitations and leg swelling. Gastrointestinal: Negative for abdominal distention, abdominal pain, anal bleeding, blood in stool, constipation, diarrhea, nausea, rectal pain and vomiting. Endocrine: Negative for cold intolerance, heat intolerance, polydipsia, polyphagia and polyuria. Genitourinary: Positive for scrotal swelling.  Negative for decreased urine volume, difficulty urinating, dysuria, enuresis, flank pain,

## 2018-06-21 ENCOUNTER — HOSPITAL ENCOUNTER (OUTPATIENT)
Age: 68
Discharge: HOME | End: 2018-06-21
Payer: MEDICARE

## 2018-06-21 DIAGNOSIS — Z90.5 HISTORY OF NEPHRECTOMY: ICD-10-CM

## 2018-06-21 DIAGNOSIS — E03.9 HYPOTHYROIDISM, UNSPECIFIED TYPE: ICD-10-CM

## 2018-06-21 DIAGNOSIS — E78.49 OTHER HYPERLIPIDEMIA: ICD-10-CM

## 2018-06-21 DIAGNOSIS — R73.01 IFG (IMPAIRED FASTING GLUCOSE): ICD-10-CM

## 2018-06-21 DIAGNOSIS — Z12.5 SCREENING FOR PROSTATE CANCER: ICD-10-CM

## 2018-06-21 LAB
ALBUMIN SERPL-MCNC: 4.4 G/DL (ref 3.5–5.1)
ALP BLD-CCNC: 94 U/L (ref 38–126)
ALT SERPL-CCNC: 57 U/L (ref 11–66)
ANION GAP SERPL CALCULATED.3IONS-SCNC: 12 MEQ/L (ref 8–16)
AST SERPL-CCNC: 35 U/L (ref 5–40)
AVERAGE GLUCOSE: 123 MG/DL (ref 70–126)
BILIRUB SERPL-MCNC: 0.7 MG/DL (ref 0.3–1.2)
BUN BLDV-MCNC: 19 MG/DL (ref 7–22)
CALCIUM SERPL-MCNC: 9.5 MG/DL (ref 8.5–10.5)
CHLORIDE BLD-SCNC: 102 MEQ/L (ref 98–111)
CHOLESTEROL, TOTAL: 197 MG/DL (ref 100–199)
CO2: 27 MEQ/L (ref 23–33)
CREAT SERPL-MCNC: 1.3 MG/DL (ref 0.4–1.2)
GFR SERPL CREATININE-BSD FRML MDRD: 55 ML/MIN/1.73M2
GLUCOSE BLD-MCNC: 99 MG/DL (ref 70–108)
HBA1C MFR BLD: 6.1 % (ref 4.4–6.4)
HDLC SERPL-MCNC: 48 MG/DL
LDL CHOLESTEROL CALCULATED: 117 MG/DL
POTASSIUM SERPL-SCNC: 4.4 MEQ/L (ref 3.5–5.2)
PROSTATE SPECIFIC ANTIGEN: 1.19 NG/ML (ref 0–1)
SODIUM BLD-SCNC: 141 MEQ/L (ref 135–145)
T4 FREE: 1.41 NG/DL (ref 0.93–1.76)
TOTAL PROTEIN: 7.4 G/DL (ref 6.1–8)
TRIGL SERPL-MCNC: 162 MG/DL (ref 0–199)
TSH SERPL DL<=0.05 MIU/L-ACNC: 4.36 UIU/ML (ref 0.4–4.2)

## 2018-06-21 PROCEDURE — 36415 COLL VENOUS BLD VENIPUNCTURE: CPT

## 2018-06-21 PROCEDURE — 84153 ASSAY OF PSA TOTAL: CPT

## 2018-06-21 PROCEDURE — 83036 HEMOGLOBIN GLYCOSYLATED A1C: CPT

## 2018-06-21 PROCEDURE — 84439 ASSAY OF FREE THYROXINE: CPT

## 2018-06-21 PROCEDURE — 80061 LIPID PANEL: CPT

## 2018-06-21 PROCEDURE — 80053 COMPREHEN METABOLIC PANEL: CPT

## 2018-06-21 PROCEDURE — 84443 ASSAY THYROID STIM HORMONE: CPT

## 2018-06-25 ENCOUNTER — OFFICE VISIT (OUTPATIENT)
Dept: FAMILY MEDICINE CLINIC | Age: 68
End: 2018-06-25
Payer: MEDICARE

## 2018-06-25 VITALS
WEIGHT: 188 LBS | HEIGHT: 70 IN | SYSTOLIC BLOOD PRESSURE: 130 MMHG | RESPIRATION RATE: 12 BRPM | BODY MASS INDEX: 26.92 KG/M2 | DIASTOLIC BLOOD PRESSURE: 76 MMHG | HEART RATE: 76 BPM

## 2018-06-25 DIAGNOSIS — I10 ESSENTIAL HYPERTENSION: Primary | ICD-10-CM

## 2018-06-25 DIAGNOSIS — E78.49 OTHER HYPERLIPIDEMIA: ICD-10-CM

## 2018-06-25 DIAGNOSIS — E03.9 HYPOTHYROIDISM, UNSPECIFIED TYPE: ICD-10-CM

## 2018-06-25 DIAGNOSIS — Z98.890 S/P MVR (MITRAL VALVE REPAIR): ICD-10-CM

## 2018-06-25 DIAGNOSIS — Z90.5 HISTORY OF NEPHRECTOMY: ICD-10-CM

## 2018-06-25 DIAGNOSIS — Z23 NEED FOR 23-POLYVALENT PNEUMOCOCCAL POLYSACCHARIDE VACCINE: ICD-10-CM

## 2018-06-25 PROCEDURE — G8510 SCR DEP NEG, NO PLAN REQD: HCPCS | Performed by: FAMILY MEDICINE

## 2018-06-25 PROCEDURE — 90732 PPSV23 VACC 2 YRS+ SUBQ/IM: CPT | Performed by: FAMILY MEDICINE

## 2018-06-25 PROCEDURE — 3288F FALL RISK ASSESSMENT DOCD: CPT | Performed by: FAMILY MEDICINE

## 2018-06-25 PROCEDURE — 99214 OFFICE O/P EST MOD 30 MIN: CPT | Performed by: FAMILY MEDICINE

## 2018-06-25 PROCEDURE — G0009 ADMIN PNEUMOCOCCAL VACCINE: HCPCS | Performed by: FAMILY MEDICINE

## 2018-06-25 RX ORDER — LEVOTHYROXINE SODIUM 100 MCG
100 TABLET ORAL DAILY
Qty: 30 TABLET | Refills: 2 | Status: SHIPPED | OUTPATIENT
Start: 2018-06-25 | End: 2018-06-26 | Stop reason: SDUPTHER

## 2018-06-25 ASSESSMENT — PATIENT HEALTH QUESTIONNAIRE - PHQ9
SUM OF ALL RESPONSES TO PHQ QUESTIONS 1-9: 0
SUM OF ALL RESPONSES TO PHQ9 QUESTIONS 1 & 2: 0
2. FEELING DOWN, DEPRESSED OR HOPELESS: 0
1. LITTLE INTEREST OR PLEASURE IN DOING THINGS: 0

## 2018-06-25 ASSESSMENT — ENCOUNTER SYMPTOMS
GASTROINTESTINAL NEGATIVE: 1
RESPIRATORY NEGATIVE: 1

## 2018-06-26 ENCOUNTER — TELEPHONE (OUTPATIENT)
Dept: FAMILY MEDICINE CLINIC | Age: 68
End: 2018-06-26

## 2018-06-26 DIAGNOSIS — E03.9 HYPOTHYROIDISM, UNSPECIFIED TYPE: ICD-10-CM

## 2018-06-26 RX ORDER — LEVOTHYROXINE SODIUM 100 MCG
100 TABLET ORAL DAILY
Qty: 90 TABLET | Refills: 3 | Status: SHIPPED | OUTPATIENT
Start: 2018-06-26 | End: 2019-06-03 | Stop reason: SDUPTHER

## 2018-08-27 RX ORDER — AMLODIPINE BESYLATE 2.5 MG/1
TABLET ORAL
Qty: 90 TABLET | Refills: 3 | Status: SHIPPED | OUTPATIENT
Start: 2018-08-27 | End: 2019-08-25 | Stop reason: SDUPTHER

## 2018-09-26 ENCOUNTER — HOSPITAL ENCOUNTER (OUTPATIENT)
Age: 68
Discharge: HOME | End: 2018-09-26
Payer: MEDICARE

## 2018-09-26 DIAGNOSIS — E03.9 HYPOTHYROIDISM, UNSPECIFIED TYPE: ICD-10-CM

## 2018-09-26 LAB — TSH SERPL DL<=0.05 MIU/L-ACNC: 1.83 UIU/ML (ref 0.4–4.2)

## 2018-09-26 PROCEDURE — 36415 COLL VENOUS BLD VENIPUNCTURE: CPT

## 2018-09-26 PROCEDURE — 84443 ASSAY THYROID STIM HORMONE: CPT

## 2018-11-08 RX ORDER — ATORVASTATIN CALCIUM 10 MG/1
TABLET, FILM COATED ORAL
Qty: 90 TABLET | Refills: 3 | Status: SHIPPED | OUTPATIENT
Start: 2018-11-08 | End: 2019-11-12 | Stop reason: SDUPTHER

## 2018-11-08 RX ORDER — FENOFIBRATE 145 MG/1
TABLET, COATED ORAL
Qty: 90 TABLET | Refills: 3 | Status: SHIPPED | OUTPATIENT
Start: 2018-11-08 | End: 2019-11-12 | Stop reason: SDUPTHER

## 2019-06-03 DIAGNOSIS — E03.9 HYPOTHYROIDISM, UNSPECIFIED TYPE: ICD-10-CM

## 2019-06-03 RX ORDER — LEVOTHYROXINE SODIUM 100 MCG
TABLET ORAL
Qty: 90 TABLET | Refills: 3 | Status: SHIPPED | OUTPATIENT
Start: 2019-06-03 | End: 2019-12-18 | Stop reason: SDUPTHER

## 2019-08-26 RX ORDER — AMLODIPINE BESYLATE 2.5 MG/1
TABLET ORAL
Qty: 90 TABLET | Refills: 4 | Status: SHIPPED | OUTPATIENT
Start: 2019-08-26 | End: 2019-11-12 | Stop reason: SDUPTHER

## 2019-11-11 ENCOUNTER — HOSPITAL ENCOUNTER (OUTPATIENT)
Age: 69
Discharge: HOME | End: 2019-11-11
Payer: MEDICARE

## 2019-11-11 DIAGNOSIS — I10 ESSENTIAL HYPERTENSION: ICD-10-CM

## 2019-11-11 DIAGNOSIS — E03.9 HYPOTHYROIDISM, UNSPECIFIED TYPE: ICD-10-CM

## 2019-11-11 DIAGNOSIS — R73.01 IFG (IMPAIRED FASTING GLUCOSE): ICD-10-CM

## 2019-11-11 DIAGNOSIS — E78.00 PURE HYPERCHOLESTEROLEMIA: ICD-10-CM

## 2019-11-11 LAB
ALBUMIN SERPL-MCNC: 4.5 G/DL (ref 3.5–5.1)
ALP BLD-CCNC: 124 U/L (ref 38–126)
ALT SERPL-CCNC: 38 U/L (ref 11–66)
ANION GAP SERPL CALCULATED.3IONS-SCNC: 12 MEQ/L (ref 8–16)
AST SERPL-CCNC: 27 U/L (ref 5–40)
AVERAGE GLUCOSE: 129 MG/DL (ref 70–126)
BASOPHILS # BLD: 0.7 %
BASOPHILS ABSOLUTE: 0.1 THOU/MM3 (ref 0–0.1)
BILIRUB SERPL-MCNC: 0.7 MG/DL (ref 0.3–1.2)
BUN BLDV-MCNC: 15 MG/DL (ref 7–22)
CALCIUM SERPL-MCNC: 10.2 MG/DL (ref 8.5–10.5)
CHLORIDE BLD-SCNC: 100 MEQ/L (ref 98–111)
CHOLESTEROL, TOTAL: 233 MG/DL (ref 100–199)
CO2: 27 MEQ/L (ref 23–33)
CREAT SERPL-MCNC: 1.2 MG/DL (ref 0.4–1.2)
EOSINOPHIL # BLD: 4.4 %
EOSINOPHILS ABSOLUTE: 0.3 THOU/MM3 (ref 0–0.4)
ERYTHROCYTE [DISTWIDTH] IN BLOOD BY AUTOMATED COUNT: 13.2 % (ref 11.5–14.5)
ERYTHROCYTE [DISTWIDTH] IN BLOOD BY AUTOMATED COUNT: 45.4 FL (ref 35–45)
GFR SERPL CREATININE-BSD FRML MDRD: 60 ML/MIN/1.73M2
GLUCOSE BLD-MCNC: 114 MG/DL (ref 70–108)
HBA1C MFR BLD: 6.3 % (ref 4.4–6.4)
HCT VFR BLD CALC: 49.7 % (ref 42–52)
HDLC SERPL-MCNC: 45 MG/DL
HEMOGLOBIN: 16.1 GM/DL (ref 14–18)
IMMATURE GRANS (ABS): 0.02 THOU/MM3 (ref 0–0.07)
IMMATURE GRANULOCYTES: 0.3 %
LDL CHOLESTEROL CALCULATED: 126 MG/DL
LYMPHOCYTES # BLD: 25.8 %
LYMPHOCYTES ABSOLUTE: 1.9 THOU/MM3 (ref 1–4.8)
MAGNESIUM: 1.9 MG/DL (ref 1.6–2.4)
MCH RBC QN AUTO: 30.4 PG (ref 26–33)
MCHC RBC AUTO-ENTMCNC: 32.4 GM/DL (ref 32.2–35.5)
MCV RBC AUTO: 94 FL (ref 80–94)
MONOCYTES # BLD: 9.1 %
MONOCYTES ABSOLUTE: 0.7 THOU/MM3 (ref 0.4–1.3)
NUCLEATED RED BLOOD CELLS: 0 /100 WBC
PLATELET # BLD: 252 THOU/MM3 (ref 130–400)
PMV BLD AUTO: 10.9 FL (ref 9.4–12.4)
POTASSIUM SERPL-SCNC: 5.1 MEQ/L (ref 3.5–5.2)
RBC # BLD: 5.29 MILL/MM3 (ref 4.7–6.1)
SEG NEUTROPHILS: 59.7 %
SEGMENTED NEUTROPHILS ABSOLUTE COUNT: 4.4 THOU/MM3 (ref 1.8–7.7)
SODIUM BLD-SCNC: 139 MEQ/L (ref 135–145)
TOTAL PROTEIN: 7.4 G/DL (ref 6.1–8)
TRIGL SERPL-MCNC: 309 MG/DL (ref 0–199)
TSH SERPL DL<=0.05 MIU/L-ACNC: 2.72 UIU/ML (ref 0.4–4.2)
WBC # BLD: 7.3 THOU/MM3 (ref 4.8–10.8)

## 2019-11-11 PROCEDURE — 83735 ASSAY OF MAGNESIUM: CPT

## 2019-11-11 PROCEDURE — 85025 COMPLETE CBC W/AUTO DIFF WBC: CPT

## 2019-11-11 PROCEDURE — 80061 LIPID PANEL: CPT

## 2019-11-11 PROCEDURE — 36415 COLL VENOUS BLD VENIPUNCTURE: CPT

## 2019-11-11 PROCEDURE — 80053 COMPREHEN METABOLIC PANEL: CPT

## 2019-11-11 PROCEDURE — 84443 ASSAY THYROID STIM HORMONE: CPT

## 2019-11-11 PROCEDURE — 83036 HEMOGLOBIN GLYCOSYLATED A1C: CPT

## 2019-11-12 ENCOUNTER — TELEPHONE (OUTPATIENT)
Dept: FAMILY MEDICINE CLINIC | Age: 69
End: 2019-11-12

## 2019-11-12 ENCOUNTER — OFFICE VISIT (OUTPATIENT)
Dept: FAMILY MEDICINE CLINIC | Age: 69
End: 2019-11-12
Payer: MEDICARE

## 2019-11-12 VITALS
RESPIRATION RATE: 18 BRPM | BODY MASS INDEX: 27.2 KG/M2 | WEIGHT: 190 LBS | DIASTOLIC BLOOD PRESSURE: 82 MMHG | HEIGHT: 70 IN | SYSTOLIC BLOOD PRESSURE: 120 MMHG | HEART RATE: 80 BPM

## 2019-11-12 DIAGNOSIS — Z98.890 S/P MVR (MITRAL VALVE REPAIR): ICD-10-CM

## 2019-11-12 DIAGNOSIS — E78.00 PURE HYPERCHOLESTEROLEMIA: ICD-10-CM

## 2019-11-12 DIAGNOSIS — Z90.5 HISTORY OF NEPHRECTOMY: ICD-10-CM

## 2019-11-12 DIAGNOSIS — I10 ESSENTIAL HYPERTENSION: ICD-10-CM

## 2019-11-12 DIAGNOSIS — R73.01 IFG (IMPAIRED FASTING GLUCOSE): ICD-10-CM

## 2019-11-12 DIAGNOSIS — E03.9 HYPOTHYROIDISM, UNSPECIFIED TYPE: ICD-10-CM

## 2019-11-12 DIAGNOSIS — Z00.00 ROUTINE GENERAL MEDICAL EXAMINATION AT A HEALTH CARE FACILITY: Primary | ICD-10-CM

## 2019-11-12 PROCEDURE — G0438 PPPS, INITIAL VISIT: HCPCS | Performed by: FAMILY MEDICINE

## 2019-11-12 RX ORDER — FENOFIBRATE 145 MG/1
TABLET, COATED ORAL
Qty: 90 TABLET | Refills: 3 | Status: SHIPPED | OUTPATIENT
Start: 2019-11-12 | End: 2020-12-28

## 2019-11-12 RX ORDER — AMLODIPINE BESYLATE 2.5 MG/1
TABLET ORAL
Qty: 90 TABLET | Refills: 3 | Status: SHIPPED | OUTPATIENT
Start: 2019-11-12 | End: 2020-11-06

## 2019-11-12 RX ORDER — FENOFIBRATE 145 MG/1
TABLET, COATED ORAL
Qty: 90 TABLET | Refills: 3 | Status: SHIPPED | OUTPATIENT
Start: 2019-11-12 | End: 2019-11-12 | Stop reason: SDUPTHER

## 2019-11-12 RX ORDER — ATORVASTATIN CALCIUM 10 MG/1
TABLET, FILM COATED ORAL
Qty: 90 TABLET | Refills: 3 | Status: SHIPPED | OUTPATIENT
Start: 2019-11-12 | End: 2019-11-12 | Stop reason: SDUPTHER

## 2019-11-12 RX ORDER — AMLODIPINE BESYLATE 2.5 MG/1
TABLET ORAL
Qty: 90 TABLET | Refills: 3 | Status: SHIPPED | OUTPATIENT
Start: 2019-11-12 | End: 2019-11-12 | Stop reason: SDUPTHER

## 2019-11-12 RX ORDER — ATORVASTATIN CALCIUM 10 MG/1
TABLET, FILM COATED ORAL
Qty: 90 TABLET | Refills: 3 | Status: SHIPPED | OUTPATIENT
Start: 2019-11-12 | End: 2020-11-06

## 2019-11-12 ASSESSMENT — LIFESTYLE VARIABLES
HOW OFTEN DO YOU HAVE SIX OR MORE DRINKS ON ONE OCCASION: 0
HAS A RELATIVE, FRIEND, DOCTOR, OR ANOTHER HEALTH PROFESSIONAL EXPRESSED CONCERN ABOUT YOUR DRINKING OR SUGGESTED YOU CUT DOWN: 0
AUDIT TOTAL SCORE: 2
HOW OFTEN DURING THE LAST YEAR HAVE YOU HAD A FEELING OF GUILT OR REMORSE AFTER DRINKING: 0
HOW OFTEN DURING THE LAST YEAR HAVE YOU BEEN UNABLE TO REMEMBER WHAT HAPPENED THE NIGHT BEFORE BECAUSE YOU HAD BEEN DRINKING: 0
HOW OFTEN DURING THE LAST YEAR HAVE YOU FAILED TO DO WHAT WAS NORMALLY EXPECTED FROM YOU BECAUSE OF DRINKING: 0
HOW OFTEN DURING THE LAST YEAR HAVE YOU NEEDED AN ALCOHOLIC DRINK FIRST THING IN THE MORNING TO GET YOURSELF GOING AFTER A NIGHT OF HEAVY DRINKING: 0
AUDIT-C TOTAL SCORE: 2
HOW OFTEN DURING THE LAST YEAR HAVE YOU FOUND THAT YOU WERE NOT ABLE TO STOP DRINKING ONCE YOU HAD STARTED: 0
HOW OFTEN DO YOU HAVE A DRINK CONTAINING ALCOHOL: 2
HAVE YOU OR SOMEONE ELSE BEEN INJURED AS A RESULT OF YOUR DRINKING: 0
HOW MANY STANDARD DRINKS CONTAINING ALCOHOL DO YOU HAVE ON A TYPICAL DAY: 0

## 2019-11-12 ASSESSMENT — ENCOUNTER SYMPTOMS
GASTROINTESTINAL NEGATIVE: 1
RESPIRATORY NEGATIVE: 1

## 2019-11-12 ASSESSMENT — PATIENT HEALTH QUESTIONNAIRE - PHQ9
SUM OF ALL RESPONSES TO PHQ QUESTIONS 1-9: 0
SUM OF ALL RESPONSES TO PHQ QUESTIONS 1-9: 0

## 2019-12-18 DIAGNOSIS — E03.9 HYPOTHYROIDISM, UNSPECIFIED TYPE: ICD-10-CM

## 2019-12-18 RX ORDER — LEVOTHYROXINE SODIUM 100 MCG
TABLET ORAL
Qty: 90 TABLET | Refills: 3 | Status: SHIPPED | OUTPATIENT
Start: 2019-12-18 | End: 2020-08-18 | Stop reason: SDUPTHER

## 2020-01-14 ENCOUNTER — OFFICE VISIT (OUTPATIENT)
Dept: FAMILY MEDICINE CLINIC | Age: 70
End: 2020-01-14
Payer: MEDICARE

## 2020-01-14 VITALS
WEIGHT: 186.1 LBS | DIASTOLIC BLOOD PRESSURE: 78 MMHG | RESPIRATION RATE: 13 BRPM | SYSTOLIC BLOOD PRESSURE: 116 MMHG | BODY MASS INDEX: 26.7 KG/M2 | OXYGEN SATURATION: 97 % | HEART RATE: 74 BPM

## 2020-01-14 PROCEDURE — 99213 OFFICE O/P EST LOW 20 MIN: CPT | Performed by: FAMILY MEDICINE

## 2020-01-14 SDOH — ECONOMIC STABILITY: TRANSPORTATION INSECURITY
IN THE PAST 12 MONTHS, HAS LACK OF TRANSPORTATION KEPT YOU FROM MEETINGS, WORK, OR FROM GETTING THINGS NEEDED FOR DAILY LIVING?: NO

## 2020-01-14 SDOH — ECONOMIC STABILITY: FOOD INSECURITY: WITHIN THE PAST 12 MONTHS, YOU WORRIED THAT YOUR FOOD WOULD RUN OUT BEFORE YOU GOT MONEY TO BUY MORE.: NEVER TRUE

## 2020-01-14 SDOH — ECONOMIC STABILITY: FOOD INSECURITY: WITHIN THE PAST 12 MONTHS, THE FOOD YOU BOUGHT JUST DIDN'T LAST AND YOU DIDN'T HAVE MONEY TO GET MORE.: NEVER TRUE

## 2020-01-14 SDOH — ECONOMIC STABILITY: TRANSPORTATION INSECURITY
IN THE PAST 12 MONTHS, HAS THE LACK OF TRANSPORTATION KEPT YOU FROM MEDICAL APPOINTMENTS OR FROM GETTING MEDICATIONS?: NO

## 2020-01-14 SDOH — ECONOMIC STABILITY: INCOME INSECURITY: HOW HARD IS IT FOR YOU TO PAY FOR THE VERY BASICS LIKE FOOD, HOUSING, MEDICAL CARE, AND HEATING?: NOT HARD AT ALL

## 2020-01-14 ASSESSMENT — PATIENT HEALTH QUESTIONNAIRE - PHQ9
SUM OF ALL RESPONSES TO PHQ QUESTIONS 1-9: 0
1. LITTLE INTEREST OR PLEASURE IN DOING THINGS: 0
SUM OF ALL RESPONSES TO PHQ QUESTIONS 1-9: 0
2. FEELING DOWN, DEPRESSED OR HOPELESS: 0
SUM OF ALL RESPONSES TO PHQ9 QUESTIONS 1 & 2: 0

## 2020-01-14 ASSESSMENT — ENCOUNTER SYMPTOMS
RESPIRATORY NEGATIVE: 1
GASTROINTESTINAL NEGATIVE: 1

## 2020-01-14 NOTE — PROGRESS NOTES
Visit Information    Have you changed or started any medications since your last visit including any over-the-counter medicines, vitamins, or herbal medicines? no   Are you having any side effects from any of your medications? -  no  Have you stopped taking any of your medications? Is so, why? -  no    Have you seen any other physician or provider since your last visit? No  Have you had any other diagnostic tests since your last visit? No  Have you been seen in the emergency room and/or had an admission to a hospital since we last saw you? No  Have you had your routine dental cleaning in the past 6 months? na  Have you activated your [email or web address] account? If not, what are your barriers? Yes     Patient Care Team:  Rita Hunt DO as PCP - General (Family Medicine)  Rita Hunt DO as PCP - [facility]  Debi Avelar DO as Consulting Physician (Nephrology)    Medical History Review  Past Medical, Family, and Social History reviewed and does contribute to the patient presenting condition    Health Maintenance   Topic Date Due    Hepatitis C screen  1950    DTaP/Tdap/Td vaccine (1 - Tdap) 07/20/1959    Shingles Vaccine (1 of 2) 07/20/1998    Flu vaccine (1) 01/14/2021 (Originally 9/1/2019)    A1C test (Diabetic or Prediabetic)  11/11/2020    Lipid screen  11/11/2020    Annual Wellness Visit (AWV)  11/11/2020    TSH testing  11/11/2020    Colon cancer screen colonoscopy  09/15/2025    Pneumococcal 65+ years Vaccine  Completed       [facility] to 2nd floor [address] [place] [address] at 10:30 am for 11:00 am Left lower extremity doppler.
prolapse of the posterior mitral leaflet. no  pericardial effusion. normal LV inflow. severe eccentric MR is anteriorly directed. mild TR. SPAP 39 mmHg.  TRANSTHORACIC ECHOCARDIOGRAM  03/29/2010    EF 55-65%. LV size & systolic funct normal. no regional wall motion abnormalities. MV moderate focal thickening of the posterior leaflet, involving the leaflet from base to margin. mild chordal involvement. valve morphology consistent w/myxomatous proliferation. holosystolic prolapse of the posterior leaflet. (9 mm). mod MR. mild TR.  TRANSTHORACIC ECHOCARDIOGRAM  02/07/2008    EF 65%. LV size & systolic funct normal. no segmental wall motion abnormalities. mild enlargement of lt atrium. mild AV sclerosis w/o stenosis. severe prolapse of the posterior leaflet of the MV. severe MR w/anteriorly directed jet. mild TR. no pericardial effusion. no thrombus or vegetation. Prior to Admission medications    Medication Sig Start Date End Date Taking? Authorizing Provider   SYNTHROID 100 MCG tablet TAKE 1 TABLET DAILY 12/18/19  Yes Baljeet Hanna DO   amLODIPine (NORVASC) 2.5 MG tablet TAKE 1 TABLET DAILY 11/12/19  Yes Baljeet Hanna DO   atorvastatin (LIPITOR) 10 MG tablet TAKE 1 TABLET DAILY 11/12/19  Yes Baljeet Hanna DO   fenofibrate (TRICOR) 145 MG tablet TAKE 1 TABLET DAILY 11/12/19  Yes Baljeet Hanna DO   gabapentin (NEURONTIN) 800 MG tablet Take 1 tablet by mouth 3 times daily 11/28/16  Yes Baljeet Hanna DO   pantoprazole (PROTONIX) 40 MG tablet Take 1 tablet by mouth 2 times daily (before meals) 7/8/15  Yes Baljeet Hanna DO   aspirin EC 81 MG EC tablet Take 1 tablet by mouth daily 7/8/15  Yes Baljeet Hanna DO         Review of Systems   Constitutional: Negative. HENT: Negative. Respiratory: Negative. Cardiovascular: Negative. Gastrointestinal: Negative. Musculoskeletal: Positive for myalgias (left calf pain).    All other systems reviewed and are

## 2020-01-16 ENCOUNTER — HOSPITAL ENCOUNTER (OUTPATIENT)
Dept: INTERVENTIONAL RADIOLOGY/VASCULAR | Age: 70
Discharge: HOME | End: 2020-01-16
Payer: MEDICARE

## 2020-01-16 PROCEDURE — 93971 EXTREMITY STUDY: CPT

## 2020-07-24 ENCOUNTER — TELEPHONE (OUTPATIENT)
Dept: FAMILY MEDICINE CLINIC | Age: 70
End: 2020-07-24

## 2020-07-24 RX ORDER — PANTOPRAZOLE SODIUM 40 MG/1
40 TABLET, DELAYED RELEASE ORAL
Qty: 90 TABLET | Refills: 3 | Status: SHIPPED | OUTPATIENT
Start: 2020-07-24 | End: 2021-01-04

## 2020-07-24 RX ORDER — GABAPENTIN 800 MG/1
800 TABLET ORAL 3 TIMES DAILY
Qty: 270 TABLET | Refills: 3 | Status: SHIPPED | OUTPATIENT
Start: 2020-07-24 | End: 2022-04-27 | Stop reason: SDUPTHER

## 2020-07-24 NOTE — TELEPHONE ENCOUNTER
Requested Prescriptions     Pending Prescriptions Disp Refills    gabapentin (NEURONTIN) 800 MG tablet 270 tablet 3     Sig: Take 1 tablet by mouth 3 times daily.  pantoprazole (PROTONIX) 40 MG tablet 90 tablet 3     Sig: Take 1 tablet by mouth 2 times daily (before meals)       Advised pt that he has 1 year scripts with [facility] on the other requested medication. Pt aware if no call back that scripts were sent.

## 2020-08-18 RX ORDER — LEVOTHYROXINE SODIUM 100 MCG
TABLET ORAL
Qty: 10 TABLET | Refills: 0 | Status: SHIPPED | OUTPATIENT
Start: 2020-08-18

## 2020-08-18 NOTE — TELEPHONE ENCOUNTER
Pt called office stating he spoke with [facility] and there was a delay on sending his Synthroid in the mail. They told pt to call his PCP to get a small rx of 7-10pills sent to [facility]. Please advise.

## 2020-08-27 ENCOUNTER — OFFICE VISIT (OUTPATIENT)
Dept: FAMILY MEDICINE CLINIC | Age: 70
End: 2020-08-27
Payer: MEDICARE

## 2020-08-27 VITALS
SYSTOLIC BLOOD PRESSURE: 122 MMHG | BODY MASS INDEX: 26.11 KG/M2 | TEMPERATURE: 97.3 F | HEART RATE: 93 BPM | WEIGHT: 182 LBS | RESPIRATION RATE: 14 BRPM | DIASTOLIC BLOOD PRESSURE: 80 MMHG

## 2020-08-27 PROCEDURE — 99213 OFFICE O/P EST LOW 20 MIN: CPT | Performed by: FAMILY MEDICINE

## 2020-08-27 RX ORDER — METHYLPREDNISOLONE 4 MG/1
TABLET ORAL
Qty: 1 KIT | Refills: 0 | Status: SHIPPED | OUTPATIENT
Start: 2020-08-27 | End: 2020-09-02

## 2020-08-27 RX ORDER — NORTRIPTYLINE HYDROCHLORIDE 25 MG/1
25 CAPSULE ORAL NIGHTLY
Qty: 30 CAPSULE | Refills: 0 | Status: SHIPPED | OUTPATIENT
Start: 2020-08-27 | End: 2020-09-29

## 2020-08-27 NOTE — PROGRESS NOTES
Visit Information    Have you changed or started any medications since your last visit including any over-the-counter medicines, vitamins, or herbal medicines? no   Are you having any side effects from any of your medications? -  no  Have you stopped taking any of your medications? Is so, why? -  no    Have you seen any other physician or provider since your last visit? No  Have you had any other diagnostic tests since your last visit? No  Have you been seen in the emergency room and/or had an admission to a hospital since we last saw you? No  Have you had your routine dental cleaning in the past 6 months? na    Have you activated your [employer] account? If not, what are your barriers?  Yes     Patient Care Team:  Tal Moya DO as PCP - General (Family Medicine)  Tal Moya DO as PCP - [facility] Provider  Shahram Barrett DO as Consulting Physician (Nephrology)    Medical History Review  Past Medical, Family, and Social History reviewed and does contribute to the patient presenting condition    Health Maintenance   Topic Date Due    Hepatitis C screen  1950    DTaP/Tdap/Td vaccine (1 - Tdap) 07/20/1967    Shingles Vaccine (1 of 2) 07/20/1998    Flu vaccine (1) 09/01/2020    A1C test (Diabetic or Prediabetic)  11/11/2020    Lipid screen  11/11/2020    TSH testing  11/11/2020    Annual Wellness Visit (AWV)  11/12/2020    Colon cancer screen colonoscopy  09/15/2025    Pneumococcal 65+ years Vaccine  Completed    Hepatitis A vaccine  Aged Out    Hepatitis B vaccine  Aged Out    Hib vaccine  Aged Out    Meningococcal (ACWY) vaccine  Aged Out

## 2020-08-27 NOTE — PROGRESS NOTES
Subjective:      Patient ID: Brooklynn Mendez is a 65 y.o. male. HPI:    Chief Complaint   Patient presents with    Herpes Zoster     flare up present for 6-8 months      Pt here for persistent pain in his scalp. Mainly located in the left temple region. No rash. Per pt, never had a rash. Shingles was dx'd years ago in the  and we have been treating him for PHN all along. Question TN? Denies eye involvement. He is taking his gabapentin TID. Patient Active Problem List   Diagnosis    Hyperlipidemia    Hypertension    Hypothyroid    HTN (hypertension)    History of nephrectomy    S/P MVR (mitral valve repair)    Lipoma    Scrotal mass     Past Surgical History:   Procedure Laterality Date    CARDIAC CATHETERIZATION      CARDIOVASCULAR STRESS TEST  04/06/2010    no CP induced  by exercise. mild htn response to exercise. no arrhythmias. no EKG chg. no wall motion abnormalities. abnormal MV structure w/MR unchanged.  [facility]  [facility]    [facility]    CHOLECYSTECTOMY      COLONOSCOPY      DIAGNOSTIC CARDIAC CATH LAB PROCEDURE      DISSECTION GROIN N/A 12/8/2017    EXCISION OF LEFT DOTTIE SCROTAL MASS performed by Matthieu Farrar MD at [facility], COLON, DIAGNOSTIC     [address]  1-23-09    [facility]    HERNIA REPAIR  2-5-07    [facility]     INGUINAL HERNIA REPAIR Left 11/3/2014    left hernia repair w mesh    KIDNEY DONATION  2005    MITRAL VALVE SURGERY  11-9-12    repair    TRANSTHORACIC ECHOCARDIOGRAM  10/24/2006    EF 65%. LV size & systolic funct normal. no regional wall motion abnormalities. normal LV wall thickness. mildly enlarged  lt atrium at 4.2 cm. mild aortic sclerosis, no stenosis. severe prolapse of the posterior mitral leaflet. no  pericardial effusion. normal LV inflow. severe eccentric MR is anteriorly directed. mild TR. SPAP 39 mmHg.     TRANSTHORACIC ECHOCARDIOGRAM  03/29/2010    EF 55-65%. LV size & systolic funct normal. no regional wall motion abnormalities. MV moderate focal thickening of the posterior leaflet, involving the leaflet from base to margin. mild chordal involvement. valve morphology consistent w/myxomatous proliferation. holosystolic prolapse of the posterior leaflet. (9 mm). mod MR. mild TR.  TRANSTHORACIC ECHOCARDIOGRAM  02/07/2008    EF 65%. LV size & systolic funct normal. no segmental wall motion abnormalities. mild enlargement of lt atrium. mild AV sclerosis w/o stenosis. severe prolapse of the posterior leaflet of the MV. severe MR w/anteriorly directed jet. mild TR. no pericardial effusion. no thrombus or vegetation. Prior to Admission medications    Medication Sig Start Date End Date Taking? Authorizing Provider   SYNTHROID 100 MCG tablet TAKE 1 TABLET DAILY 8/18/20  Yes Key Res, DO   gabapentin (NEURONTIN) 800 MG tablet Take 1 tablet by mouth 3 times daily. 7/24/20 7/24/21 Yes Key Res, DO   pantoprazole (PROTONIX) 40 MG tablet Take 1 tablet by mouth 2 times daily (before meals) 7/24/20  Yes Key Res, DO   amLODIPine (NORVASC) 2.5 MG tablet TAKE 1 TABLET DAILY 11/12/19  Yes Key Res, DO   atorvastatin (LIPITOR) 10 MG tablet TAKE 1 TABLET DAILY 11/12/19  Yes Key Res, DO   fenofibrate (TRICOR) 145 MG tablet TAKE 1 TABLET DAILY 11/12/19  Yes Key Res, DO   aspirin EC 81 MG EC tablet Take 1 tablet by mouth daily 7/8/15  Yes Key Res, DO         Review of Systems   Constitutional: Negative. HENT: Negative. Respiratory: Negative. Cardiovascular: Negative. Gastrointestinal: Negative. Musculoskeletal: Negative. Neurological:        Scalp pain   All other systems reviewed and are negative. Objective:   Physical Exam  Vitals signs and nursing note reviewed. Constitutional:       Appearance: He is well-developed.    HENT:      Head: Normocephalic and atraumatic. Right Ear: Tympanic membrane normal.      Left Ear: Tympanic membrane normal.   Cardiovascular:      Rate and Rhythm: Normal rate and regular rhythm. Heart sounds: Normal heart sounds. No murmur. Pulmonary:      Effort: Pulmonary effort is normal.      Breath sounds: Normal breath sounds. Abdominal:      General: Bowel sounds are normal.      Palpations: Abdomen is soft. Skin:     General: Skin is warm and dry. Neurological:      Mental Status: He is alert and oriented to person, place, and time. Psychiatric:         Behavior: Behavior normal.         Assessment:       Diagnosis Orders   1.  Post herpetic neuralgia  methylPREDNISolone (MEDROL, MORAIMA,) 4 MG tablet    nortriptyline (PAMELOR) 25 MG capsule           Plan:      -  Exam wnl other than skin sensitivity left forehead and scalp  -  Shingles was dx'd years ago by [facility] but according to patient he never had a rash  -  Question TN?  -  Continue gabapentin  -  Additional orders above  -  RTO prn for now, will keep me updated on progress        Silas Lakhani, DO

## 2020-08-31 ASSESSMENT — ENCOUNTER SYMPTOMS
RESPIRATORY NEGATIVE: 1
GASTROINTESTINAL NEGATIVE: 1

## 2020-09-29 ENCOUNTER — OFFICE VISIT (OUTPATIENT)
Dept: FAMILY MEDICINE CLINIC | Age: 70
End: 2020-09-29
Payer: MEDICARE

## 2020-09-29 VITALS
HEART RATE: 91 BPM | RESPIRATION RATE: 15 BRPM | TEMPERATURE: 98.6 F | SYSTOLIC BLOOD PRESSURE: 120 MMHG | BODY MASS INDEX: 26.33 KG/M2 | DIASTOLIC BLOOD PRESSURE: 80 MMHG | WEIGHT: 183.5 LBS

## 2020-09-29 PROCEDURE — 99213 OFFICE O/P EST LOW 20 MIN: CPT | Performed by: FAMILY MEDICINE

## 2020-09-29 RX ORDER — NORTRIPTYLINE HYDROCHLORIDE 50 MG/1
50 CAPSULE ORAL NIGHTLY
Qty: 30 CAPSULE | Refills: 0 | Status: SHIPPED | OUTPATIENT
Start: 2020-09-29 | End: 2020-10-16 | Stop reason: SDUPTHER

## 2020-09-29 ASSESSMENT — ENCOUNTER SYMPTOMS
RESPIRATORY NEGATIVE: 1
GASTROINTESTINAL NEGATIVE: 1

## 2020-09-29 NOTE — PROGRESS NOTES
Subjective:      Patient ID: Roselyn Crouch is a 65 y.o. male. HPI:    Chief Complaint   Patient presents with    Discuss Medications     for post herpatic neuralgia      Pt here to discuss his medications for PHN. Again, I'm not sure at this time if this is PHN or TGN. This was initially dx'd with shingles, but per pt he never had a rash. He is currently on Pamelor and gabapentin. Doing much better with the addition of the Pamelor and increased gabapentin. Tolerating meds fine. Patient Active Problem List   Diagnosis    Hyperlipidemia    Hypertension    Hypothyroid    HTN (hypertension)    History of nephrectomy    S/P MVR (mitral valve repair)    Lipoma    Scrotal mass     Past Surgical History:   Procedure Laterality Date    CARDIAC CATHETERIZATION      CARDIOVASCULAR STRESS TEST  04/06/2010    no CP induced  by exercise. mild htn response to exercise. no arrhythmias. no EKG chg. no wall motion abnormalities. abnormal MV structure w/MR unchanged.  [facility]  [facility]    CHOLECYSTECTOMY      COLONOSCOPY      DIAGNOSTIC CARDIAC CATH LAB PROCEDURE      DISSECTION GROIN N/A 12/8/2017    EXCISION OF LEFT DOTTIE SCROTAL MASS performed by Jan Cordova MD at [facility], COLON, DIAGNOSTIC     [address]  1-23-09    [facility]    HERNIA REPAIR  2-5-07    [facility]     INGUINAL HERNIA REPAIR Left 11/3/2014    left hernia repair w mesh    KIDNEY DONATION  2005    MITRAL VALVE SURGERY  11-9-12    repair    TRANSTHORACIC ECHOCARDIOGRAM  10/24/2006    EF 65%. LV size & systolic funct normal. no regional wall motion abnormalities. normal LV wall thickness. mildly enlarged  lt atrium at 4.2 cm. mild aortic sclerosis, no stenosis. severe prolapse of the posterior mitral leaflet. no  pericardial effusion. normal LV inflow. severe eccentric MR is anteriorly directed. mild TR.  SPAP 39 mmHg.    TRANSTHORACIC ECHOCARDIOGRAM  03/29/2010    EF 55-65%. LV size & systolic funct normal. no regional wall motion abnormalities. MV moderate focal thickening of the posterior leaflet, involving the leaflet from base to margin. mild chordal involvement. valve morphology consistent w/myxomatous proliferation. holosystolic prolapse of the posterior leaflet. (9 mm). mod MR. mild TR.  TRANSTHORACIC ECHOCARDIOGRAM  02/07/2008    EF 65%. LV size & systolic funct normal. no segmental wall motion abnormalities. mild enlargement of lt atrium. mild AV sclerosis w/o stenosis. severe prolapse of the posterior leaflet of the MV. severe MR w/anteriorly directed jet. mild TR. no pericardial effusion. no thrombus or vegetation. Prior to Admission medications    Medication Sig Start Date End Date Taking? Authorizing Provider   nortriptyline (PAMELOR) 25 MG capsule Take 1 capsule by mouth nightly 8/27/20  Yes Odilon Medrano DO   SYNTHROID 100 MCG tablet TAKE 1 TABLET DAILY 8/18/20  Yes Odilon Medrano DO   gabapentin (NEURONTIN) 800 MG tablet Take 1 tablet by mouth 3 times daily. 7/24/20 7/24/21 Yes Odilon Medrano DO   pantoprazole (PROTONIX) 40 MG tablet Take 1 tablet by mouth 2 times daily (before meals) 7/24/20  Yes Odilon Medrano DO   amLODIPine (NORVASC) 2.5 MG tablet TAKE 1 TABLET DAILY 11/12/19  Yes Odilon Medrano DO   atorvastatin (LIPITOR) 10 MG tablet TAKE 1 TABLET DAILY 11/12/19  Yes Odilon Medrano DO   fenofibrate (TRICOR) 145 MG tablet TAKE 1 TABLET DAILY 11/12/19  Yes Odilon Medrano DO   aspirin EC 81 MG EC tablet Take 1 tablet by mouth daily 7/8/15  Yes Odilon Medrano,          Review of Systems   Constitutional: Negative. HENT: Negative. Respiratory: Negative. Cardiovascular: Negative. Gastrointestinal: Negative. Musculoskeletal: Negative. Neurological: Positive for headaches. All other systems reviewed and are negative.       Objective: Physical Exam  Vitals signs and nursing note reviewed. Constitutional:       Appearance: He is well-developed. HENT:      Head: Normocephalic and atraumatic. Right Ear: Tympanic membrane normal.      Left Ear: Tympanic membrane normal.   Cardiovascular:      Rate and Rhythm: Normal rate and regular rhythm. Heart sounds: Normal heart sounds. No murmur. Pulmonary:      Effort: Pulmonary effort is normal.      Breath sounds: Normal breath sounds. Abdominal:      General: Bowel sounds are normal.      Palpations: Abdomen is soft. Skin:     General: Skin is warm and dry. Neurological:      Mental Status: He is alert and oriented to person, place, and time. Psychiatric:         Behavior: Behavior normal.         Assessment:       Diagnosis Orders   1.  Post herpetic neuralgia  nortriptyline (PAMELOR) 50 MG capsule           Plan:      -  Continue gabapentin, increase Pamelor to 50 mg QHS  -  Call office in a few weeks with update  -  RTO prn for now        Catalino Ruggiero DO

## 2020-09-29 NOTE — PROGRESS NOTES
Visit Information    Have you changed or started any medications since your last visit including any over-the-counter medicines, vitamins, or herbal medicines? no   Are you having any side effects from any of your medications? -  no  Have you stopped taking any of your medications? Is so, why? -  no    Have you seen any other physician or provider since your last visit? No  Have you had any other diagnostic tests since your last visit? No  Have you been seen in the emergency room and/or had an admission to a hospital since we last saw you? No  Have you had your routine dental cleaning in the past 6 months? na    Have you activated your [employer] account? If not, what are your barriers?  Yes     Patient Care Team:  Nuris Sims DO as PCP - General (Family Medicine)  Nuris Sims DO as PCP - [facility] Provider  Julien Cotton DO as Consulting Physician (Nephrology)    Medical History Review  Past Medical, Family, and Social History reviewed and does contribute to the patient presenting condition    Health Maintenance   Topic Date Due    Hepatitis C screen  1950    DTaP/Tdap/Td vaccine (1 - Tdap) 07/20/1967    Shingles Vaccine (1 of 2) 07/20/1998    Flu vaccine (1) 09/01/2020    A1C test (Diabetic or Prediabetic)  11/11/2020    Lipid screen  11/11/2020    TSH testing  11/11/2020    Annual Wellness Visit (AWV)  11/12/2020    Colon cancer screen colonoscopy  09/15/2025    Pneumococcal 65+ years Vaccine  Completed    Hepatitis A vaccine  Aged Out    Hepatitis B vaccine  Aged Out    Hib vaccine  Aged Out    Meningococcal (ACWY) vaccine  Aged Out

## 2020-10-16 RX ORDER — NORTRIPTYLINE HYDROCHLORIDE 50 MG/1
50 CAPSULE ORAL NIGHTLY
Qty: 30 CAPSULE | Refills: 0 | Status: SHIPPED | OUTPATIENT
Start: 2020-10-16 | End: 2020-10-29 | Stop reason: SDUPTHER

## 2020-10-16 NOTE — TELEPHONE ENCOUNTER
Marisel England called requesting a refill on the following medications:  Requested Prescriptions     Pending Prescriptions Disp Refills    nortriptyline (PAMELOR) 50 MG capsule 30 capsule 0     Sig: Take 1 capsule by mouth nightly     [facility] [email or web address]      Date of last visit: 9/29/20  Date of next visit (if applicable): Visit date not found      Patient is asking for a 90 day supply please advise.

## 2020-10-29 RX ORDER — NORTRIPTYLINE HYDROCHLORIDE 50 MG/1
50 CAPSULE ORAL NIGHTLY
Qty: 30 CAPSULE | Refills: 0 | Status: SHIPPED | OUTPATIENT
Start: 2020-10-29 | End: 2020-11-09

## 2020-10-29 NOTE — TELEPHONE ENCOUNTER
Pt called office stating he has not yet received the rx for Nortriptyline from [facility] yet and he only has 2 pills left. Pt is asking for a 30 day supply to be sent to [facility] on [address]. Says these pills are working good for him. If no call back, he will check with the pharmacy after noon. Refill if appropriate.

## 2020-11-03 PROBLEM — I10 HYPERTENSION: Status: RESOLVED | Noted: 2020-11-03 | Resolved: 2020-11-03

## 2020-11-06 RX ORDER — AMLODIPINE BESYLATE 2.5 MG/1
TABLET ORAL
Qty: 90 TABLET | Refills: 3 | Status: SHIPPED | OUTPATIENT
Start: 2020-11-06

## 2020-11-06 RX ORDER — ATORVASTATIN CALCIUM 10 MG/1
TABLET, FILM COATED ORAL
Qty: 90 TABLET | Refills: 3 | Status: SHIPPED | OUTPATIENT
Start: 2020-11-06

## 2020-11-09 RX ORDER — NORTRIPTYLINE HYDROCHLORIDE 50 MG/1
CAPSULE ORAL
Qty: 30 CAPSULE | Refills: 11 | Status: SHIPPED | OUTPATIENT
Start: 2020-11-09 | End: 2021-01-25 | Stop reason: SDUPTHER

## 2020-12-28 RX ORDER — FENOFIBRATE 145 MG/1
TABLET, COATED ORAL
Qty: 90 TABLET | Refills: 3 | Status: SHIPPED | OUTPATIENT
Start: 2020-12-28 | End: 2021-12-02

## 2021-01-25 DIAGNOSIS — B02.29 POST HERPETIC NEURALGIA: ICD-10-CM

## 2021-01-25 RX ORDER — NORTRIPTYLINE HYDROCHLORIDE 50 MG/1
CAPSULE ORAL
Qty: 90 CAPSULE | Refills: 3 | Status: SHIPPED | OUTPATIENT
Start: 2021-01-25 | End: 2021-12-28

## 2021-01-25 NOTE — TELEPHONE ENCOUNTER
Requested Prescriptions     Pending Prescriptions Disp Refills    nortriptyline (PAMELOR) 50 MG capsule 90 capsule 3     Sig: TAKE 1 CAPSULE NIGHTLY

## 2021-02-13 DIAGNOSIS — E03.9 HYPOTHYROIDISM, UNSPECIFIED TYPE: ICD-10-CM

## 2021-02-15 RX ORDER — LEVOTHYROXINE SODIUM 100 MCG
TABLET ORAL
Qty: 90 TABLET | Refills: 3 | OUTPATIENT
Start: 2021-02-15

## 2021-03-30 ENCOUNTER — TELEPHONE (OUTPATIENT)
Dept: FAMILY MEDICINE CLINIC | Age: 71
End: 2021-03-30

## 2021-03-30 RX ORDER — SILDENAFIL 100 MG/1
100 TABLET, FILM COATED ORAL PRN
Qty: 10 TABLET | Refills: 5 | Status: SHIPPED | OUTPATIENT
Start: 2021-03-30 | End: 2022-01-10 | Stop reason: SDUPTHER

## 2021-03-30 NOTE — TELEPHONE ENCOUNTER
The patient called in and left a VM asking for a script for Viagra be sent into [facility]. Please advise.

## 2021-06-29 NOTE — TELEPHONE ENCOUNTER
I phoned pt at the number given, the female says to call him at [phone]. Pt was notified and voiced understanding.

## 2021-09-23 ENCOUNTER — TELEPHONE (OUTPATIENT)
Dept: FAMILY MEDICINE CLINIC | Age: 71
End: 2021-09-23

## 2021-09-23 NOTE — TELEPHONE ENCOUNTER
Spoke with pt and he has had skin tags in his armpits and one on his leg that have been present for years. He would like them removed. Please advise. Pt has an appt with a  at 2pm, he will give us a call back for answer once he gets done.

## 2021-09-23 NOTE — TELEPHONE ENCOUNTER
----- Message from Leida Flower sent at 9/23/2021  1:26 PM EDT -----  Subject: Message to Provider    QUESTIONS  Information for Provider? Pt called in and said he is starting to get   warts and wants them removed. Did not know if PCP did this or he needs to   be send somewhere. Please call him when you can  ---------------------------------------------------------------------------  --------------  CALL BACK INFO  What is the best way for the office to contact you? OK to leave message on   voicemail  Preferred Call Back Phone Number? [phone]  ---------------------------------------------------------------------------  --------------  SCRIPT ANSWERS  Relationship to Patient?  Self

## 2021-09-24 NOTE — TELEPHONE ENCOUNTER
Per [facility] pt is already scheduled for a 30min appt next week on 9/30/2021.

## 2021-09-30 ENCOUNTER — PROCEDURE VISIT (OUTPATIENT)
Dept: FAMILY MEDICINE CLINIC | Age: 71
End: 2021-09-30
Payer: MEDICARE

## 2021-09-30 VITALS
HEIGHT: 68 IN | WEIGHT: 174.7 LBS | HEART RATE: 68 BPM | SYSTOLIC BLOOD PRESSURE: 130 MMHG | BODY MASS INDEX: 26.48 KG/M2 | DIASTOLIC BLOOD PRESSURE: 78 MMHG | RESPIRATION RATE: 13 BRPM

## 2021-09-30 DIAGNOSIS — L91.8 SKIN TAG: Primary | ICD-10-CM

## 2021-09-30 DIAGNOSIS — L82.1 SEBORRHEIC KERATOSES: ICD-10-CM

## 2021-09-30 PROCEDURE — 99213 OFFICE O/P EST LOW 20 MIN: CPT | Performed by: FAMILY MEDICINE

## 2021-09-30 PROCEDURE — 11200 RMVL SKIN TAGS UP TO&INC 15: CPT | Performed by: FAMILY MEDICINE

## 2021-09-30 SDOH — ECONOMIC STABILITY: FOOD INSECURITY: WITHIN THE PAST 12 MONTHS, YOU WORRIED THAT YOUR FOOD WOULD RUN OUT BEFORE YOU GOT MONEY TO BUY MORE.: NEVER TRUE

## 2021-09-30 SDOH — ECONOMIC STABILITY: FOOD INSECURITY: WITHIN THE PAST 12 MONTHS, THE FOOD YOU BOUGHT JUST DIDN'T LAST AND YOU DIDN'T HAVE MONEY TO GET MORE.: NEVER TRUE

## 2021-09-30 ASSESSMENT — PATIENT HEALTH QUESTIONNAIRE - PHQ9
SUM OF ALL RESPONSES TO PHQ QUESTIONS 1-9: 0
SUM OF ALL RESPONSES TO PHQ9 QUESTIONS 1 & 2: 0
1. LITTLE INTEREST OR PLEASURE IN DOING THINGS: 0
SUM OF ALL RESPONSES TO PHQ QUESTIONS 1-9: 0
2. FEELING DOWN, DEPRESSED OR HOPELESS: 0
SUM OF ALL RESPONSES TO PHQ QUESTIONS 1-9: 0

## 2021-09-30 ASSESSMENT — SOCIAL DETERMINANTS OF HEALTH (SDOH): HOW HARD IS IT FOR YOU TO PAY FOR THE VERY BASICS LIKE FOOD, HOUSING, MEDICAL CARE, AND HEATING?: NOT HARD AT ALL

## 2021-09-30 ASSESSMENT — ENCOUNTER SYMPTOMS
GASTROINTESTINAL NEGATIVE: 1
RESPIRATORY NEGATIVE: 1

## 2021-09-30 NOTE — PROGRESS NOTES
Donna Blas (:  1950) is a 66 y.o. male,Established patient, here for evaluation of the following chief complaint(s): Mole (wart check bilateral arms, behind bilateral legs) and Immunizations (declines flu vaccine today )        Subjective   SUBJECTIVE/OBJECTIVE:  HPI:    Chief Complaint   Patient presents with    Mole     wart check bilateral arms, behind bilateral legs    Immunizations     declines flu vaccine today      Pt presents today with concern regarding skin tags and atypical moles on arms and legs. Patient Active Problem List   Diagnosis    Hyperlipidemia    Hypothyroid    HTN (hypertension)    History of nephrectomy    S/P MVR (mitral valve repair)    Lipoma    Scrotal mass     Past Surgical History:   Procedure Laterality Date    CARDIAC CATHETERIZATION      CARDIOVASCULAR STRESS TEST  2010    no CP induced  by exercise. mild htn response to exercise. no arrhythmias. no EKG chg. no wall motion abnormalities. abnormal MV structure w/MR unchanged.  CHOLECYSTECTOMY      [facility]    CHOLECYSTECTOMY      COLONOSCOPY      DIAGNOSTIC CARDIAC CATH LAB PROCEDURE      DISSECTION GROIN N/A 2017    EXCISION OF LEFT DOTTIE SCROTAL MASS performed by Enedelia Lovelace MD at [facility], [place], DIAGNOSTIC     [phone]    [facility]- left indirect and direct inguinal hernia    HERNIA REPAIR  07    Right inguinal hernia- [facility]     INGUINAL HERNIA REPAIR Left 11/3/2014    left hernia repair w mesh    KIDNEY DONATION      MITRAL VALVE SURGERY  12    repair    TRANSTHORACIC ECHOCARDIOGRAM  10/24/2006    EF 65%. LV size & systolic funct normal. no regional wall motion abnormalities. normal LV wall thickness. mildly enlarged  lt atrium at 4.2 cm. mild aortic sclerosis, no stenosis. severe prolapse of the posterior mitral leaflet. no  pericardial effusion. normal LV inflow. severe eccentric MR is anteriorly directed. mild TR. SPAP 39 mmHg.  TRANSTHORACIC ECHOCARDIOGRAM  03/29/2010    EF 55-65%. LV size & systolic funct normal. no regional wall motion abnormalities. MV moderate focal thickening of the posterior leaflet, involving the leaflet from base to margin. mild chordal involvement. valve morphology consistent w/myxomatous proliferation. holosystolic prolapse of the posterior leaflet. (9 mm). mod MR. mild TR.  TRANSTHORACIC ECHOCARDIOGRAM  02/07/2008    EF 65%. LV size & systolic funct normal. no segmental wall motion abnormalities. mild enlargement of lt atrium. mild AV sclerosis w/o stenosis. severe prolapse of the posterior leaflet of the MV. severe MR w/anteriorly directed jet. mild TR. no pericardial effusion. no thrombus or vegetation. Social History     Tobacco Use    Smoking status: Never Smoker    Smokeless tobacco: Never Used   Vaping Use    Vaping Use: Never used   Substance Use Topics    Alcohol use: Yes     Alcohol/week: 2.0 standard drinks     Types: 2 Cans of beer per week     Comment: 2 ALCOHOLIC BEVERAGES WEEKLY    Drug use: No         Review of Systems   Constitutional: Negative. HENT: Negative. Respiratory: Negative. Cardiovascular: Negative. Gastrointestinal: Negative. Musculoskeletal: Negative. Skin:        Skin tags, moles   All other systems reviewed and are negative. Objective   Physical Exam  Vitals and nursing note reviewed. Constitutional:       General: He is not in acute distress. Appearance: Normal appearance. He is well-developed. HENT:      Head: Normocephalic and atraumatic. Right Ear: Tympanic membrane normal.      Left Ear: Tympanic membrane normal.   Eyes:      Conjunctiva/sclera: Conjunctivae normal.   Cardiovascular:      Rate and Rhythm: Normal rate and regular rhythm. Heart sounds: Normal heart sounds. No murmur heard. Pulmonary:      Effort: Pulmonary effort is normal.      Breath sounds: Normal breath sounds.  No wheezing, rhonchi or rales. Abdominal:      General: There is no distension. Musculoskeletal:      Cervical back: Neck supple. Skin:     General: Skin is warm and dry. Findings: No rash (on exposed surfaces). Comments: Multiple skin tags noted in bilateral axillary regions. Other areas of concern are SK's, no atypia appreciated. Neurological:      General: No focal deficit present. Mental Status: He is alert. Psychiatric:         Attention and Perception: Attention normal.         Mood and Affect: Mood normal.         Speech: Speech normal.         Behavior: Behavior normal. Behavior is cooperative. Thought Content: Thought content normal.         Judgment: Judgment normal.               ASSESSMENT/PLAN:  1. Skin tag  -     [address]55 - [place] REMOVAL OF SKIN TAGS, UP TO 15  2. Seborrheic keratoses    -  Risks/benefits discussed, consent signed. 9 skin tags located on axilla were removed by hyfrecation without difficulty. Each skin tag was cleansed with alcohol and anesthetized with 1% Lidocaine and Epinephrine. Wound care instructions were given to patient  -  Reassurance regarding #2      Return for as needed. An electronic signature was used to authenticate this note.     --Hayley Elizondo, DO

## 2021-11-15 DIAGNOSIS — K27.9 PUD (PEPTIC ULCER DISEASE): ICD-10-CM

## 2021-11-15 RX ORDER — PANTOPRAZOLE SODIUM 40 MG/1
TABLET, DELAYED RELEASE ORAL
Qty: 180 TABLET | Refills: 3 | Status: SHIPPED | OUTPATIENT
Start: 2021-11-15

## 2021-12-02 DIAGNOSIS — E78.00 PURE HYPERCHOLESTEROLEMIA: ICD-10-CM

## 2021-12-02 RX ORDER — FENOFIBRATE 145 MG/1
TABLET, COATED ORAL
Qty: 90 TABLET | Refills: 3 | Status: SHIPPED | OUTPATIENT
Start: 2021-12-02

## 2021-12-28 DIAGNOSIS — B02.29 POST HERPETIC NEURALGIA: ICD-10-CM

## 2021-12-28 RX ORDER — NORTRIPTYLINE HYDROCHLORIDE 50 MG/1
CAPSULE ORAL
Qty: 90 CAPSULE | Refills: 3 | Status: SHIPPED | OUTPATIENT
Start: 2021-12-28

## 2022-01-10 ENCOUNTER — OFFICE VISIT (OUTPATIENT)
Dept: FAMILY MEDICINE CLINIC | Age: 72
End: 2022-01-10
Payer: MEDICARE

## 2022-01-10 VITALS
RESPIRATION RATE: 16 BRPM | HEART RATE: 76 BPM | BODY MASS INDEX: 27.09 KG/M2 | SYSTOLIC BLOOD PRESSURE: 126 MMHG | DIASTOLIC BLOOD PRESSURE: 70 MMHG | HEIGHT: 69 IN | WEIGHT: 182.9 LBS

## 2022-01-10 DIAGNOSIS — Z98.890 S/P MVR (MITRAL VALVE REPAIR): ICD-10-CM

## 2022-01-10 DIAGNOSIS — E03.9 HYPOTHYROIDISM, UNSPECIFIED TYPE: ICD-10-CM

## 2022-01-10 DIAGNOSIS — Z00.00 ROUTINE GENERAL MEDICAL EXAMINATION AT A HEALTH CARE FACILITY: Primary | ICD-10-CM

## 2022-01-10 DIAGNOSIS — K27.9 PUD (PEPTIC ULCER DISEASE): ICD-10-CM

## 2022-01-10 DIAGNOSIS — Z90.5 HISTORY OF NEPHRECTOMY: ICD-10-CM

## 2022-01-10 DIAGNOSIS — R73.01 IFG (IMPAIRED FASTING GLUCOSE): ICD-10-CM

## 2022-01-10 DIAGNOSIS — E78.00 PURE HYPERCHOLESTEROLEMIA: ICD-10-CM

## 2022-01-10 DIAGNOSIS — Z12.5 SCREENING FOR PROSTATE CANCER: ICD-10-CM

## 2022-01-10 DIAGNOSIS — B02.29 POST HERPETIC NEURALGIA: ICD-10-CM

## 2022-01-10 DIAGNOSIS — I10 ESSENTIAL HYPERTENSION: ICD-10-CM

## 2022-01-10 PROCEDURE — G0439 PPPS, SUBSEQ VISIT: HCPCS | Performed by: FAMILY MEDICINE

## 2022-01-10 RX ORDER — SILDENAFIL 100 MG/1
100 TABLET, FILM COATED ORAL PRN
Qty: 10 TABLET | Refills: 5 | Status: SHIPPED | OUTPATIENT
Start: 2022-01-10

## 2022-01-10 ASSESSMENT — PATIENT HEALTH QUESTIONNAIRE - PHQ9
SUM OF ALL RESPONSES TO PHQ QUESTIONS 1-9: 0
2. FEELING DOWN, DEPRESSED OR HOPELESS: 0
SUM OF ALL RESPONSES TO PHQ9 QUESTIONS 1 & 2: 0
SUM OF ALL RESPONSES TO PHQ QUESTIONS 1-9: 0
1. LITTLE INTEREST OR PLEASURE IN DOING THINGS: 0

## 2022-01-10 ASSESSMENT — LIFESTYLE VARIABLES
AUDIT-C TOTAL SCORE: 2
HOW OFTEN DO YOU HAVE A DRINK CONTAINING ALCOHOL: 2
HOW OFTEN DURING THE LAST YEAR HAVE YOU FOUND THAT YOU WERE NOT ABLE TO STOP DRINKING ONCE YOU HAD STARTED: 0
AUDIT TOTAL SCORE: 2
HAVE YOU OR SOMEONE ELSE BEEN INJURED AS A RESULT OF YOUR DRINKING: 0
HOW OFTEN DURING THE LAST YEAR HAVE YOU FAILED TO DO WHAT WAS NORMALLY EXPECTED FROM YOU BECAUSE OF DRINKING: 0
HOW OFTEN DO YOU HAVE SIX OR MORE DRINKS ON ONE OCCASION: 0
HAS A RELATIVE, FRIEND, DOCTOR, OR ANOTHER HEALTH PROFESSIONAL EXPRESSED CONCERN ABOUT YOUR DRINKING OR SUGGESTED YOU CUT DOWN: 0
HOW OFTEN DURING THE LAST YEAR HAVE YOU HAD A FEELING OF GUILT OR REMORSE AFTER DRINKING: 0
HOW OFTEN DURING THE LAST YEAR HAVE YOU NEEDED AN ALCOHOLIC DRINK FIRST THING IN THE MORNING TO GET YOURSELF GOING AFTER A NIGHT OF HEAVY DRINKING: 0
HOW MANY STANDARD DRINKS CONTAINING ALCOHOL DO YOU HAVE ON A TYPICAL DAY: 0
HOW OFTEN DURING THE LAST YEAR HAVE YOU BEEN UNABLE TO REMEMBER WHAT HAPPENED THE NIGHT BEFORE BECAUSE YOU HAD BEEN DRINKING: 0

## 2022-01-10 ASSESSMENT — ENCOUNTER SYMPTOMS
GASTROINTESTINAL NEGATIVE: 1
RESPIRATORY NEGATIVE: 1

## 2022-01-10 NOTE — PATIENT INSTRUCTIONS
You may receive a survey about your visit with us today. The feedback from our patients helps us identify what is working well and where the service to all patients can be enhanced. Thank you! Personalized Preventive Plan for Caity Gray - 1/10/2022  Medicare offers a range of preventive health benefits. Some of the tests and screenings are paid in full while other may be subject to a deductible, co-insurance, and/or copay. Some of these benefits include a comprehensive review of your medical history including lifestyle, illnesses that may run in your family, and various assessments and screenings as appropriate. After reviewing your medical record and screening and assessments performed today your provider may have ordered immunizations, labs, imaging, and/or referrals for you. A list of these orders (if applicable) as well as your Preventive Care list are included within your After Visit Summary for your review. Other Preventive Recommendations:    · A preventive eye exam performed by an eye specialist is recommended every 1-2 years to screen for glaucoma; cataracts, macular degeneration, and other eye disorders. · A preventive dental visit is recommended every 6 months. · Try to get at least 150 minutes of exercise per week or 10,000 steps per day on a pedometer . · Order or download the FREE \"Exercise & Physical Activity: Your Everyday Guide\" from [employer]. Call 8-857.237.2360 or search [employer] online. · You need 7336-5461 mg of calcium and 7707-0143 IU of vitamin D per day. It is possible to meet your calcium requirement with diet alone, but a vitamin D supplement is usually necessary to meet this goal.  · When exposed to the sun, use a sunscreen that protects against both UVA and UVB radiation with an SPF of 30 or greater. Reapply every 2 to 3 hours or after sweating, drying off with a towel, or swimming.   · Always wear a seat belt when traveling in a car. Always wear a helmet when riding a bicycle or motorcycle.

## 2022-01-10 NOTE — PROGRESS NOTES
1/10/2022    Smitha Mchugh (:  1950) is a 66 y.o. male, here for a preventive medicine evaluation. Chief Complaint   Patient presents with   [facility] Medicare AWV     AWV. Doing well overall. BPs and weights stable. BP Readings from Last 3 Encounters:   01/10/22 126/70   21 130/78   20 120/80     Wt Readings from Last 3 Encounters:   01/10/22 182 lb 14.4 oz (83 kg)   21 174 lb 11.2 oz (79.2 kg)   20 183 lb 8 oz (83.2 kg)     Tolerating meds, compliant. Patient Active Problem List   Diagnosis    Hyperlipidemia    Hypothyroid    HTN (hypertension)    History of nephrectomy    S/P MVR (mitral valve repair)    Lipoma    Scrotal mass       Review of Systems   Constitutional: Negative. HENT: Negative. Respiratory: Negative. Cardiovascular: Negative. Gastrointestinal: Negative. Musculoskeletal: Negative. All other systems reviewed and are negative. Prior to Visit Medications    Medication Sig Taking? Authorizing Provider   sildenafil (VIAGRA) 100 MG tablet Take 1 tablet by mouth as needed for Erectile Dysfunction Yes Amherst Rai, DO   nortriptyline (PAMELOR) 50 MG capsule TAKE 1 CAPSULE NIGHTLY Yes FRANCESCO Montes CNP   fenofibrate (TRICOR) 145 MG tablet TAKE 1 TABLET DAILY Yes Amherst Rai, DO   pantoprazole (PROTONIX) 40 MG tablet TAKE 1 TABLET TWICE A DAY BEFORE MEALS Yes Amherst Rai, DO   atorvastatin (LIPITOR) 10 MG tablet TAKE 1 TABLET DAILY Yes Amherst Rai, DO   amLODIPine (NORVASC) 2.5 MG tablet TAKE 1 TABLET DAILY Yes Amherst Rai, DO   SYNTHROID 100 MCG tablet TAKE 1 TABLET DAILY Yes Amherst Rai, DO   gabapentin (NEURONTIN) 800 MG tablet Take 1 tablet by mouth 3 times daily. Patient taking differently: Take 800 mg by mouth 3 times daily.  1 to 3x daily PRN Yes Amherst Rai, DO   aspirin EC 81 MG EC tablet Take 1 tablet by mouth daily Yes Amherst Rai, DO Allergies   Allergen Reactions    Tape [place] Tape] Dermatitis     Plastic tape       Past Medical History:   Diagnosis Date    CKD (chronic kidney disease) stage 3, GFR 30-59 ml/min ([facility])     only one kidney     Hyperlipidemia     Hypertension     Mitral valve regurgitation     MOD-SVERE s/p valvuloplasty    Other sleep disturbances     Thyroid disease        Past Surgical History:   Procedure Laterality Date    CARDIAC CATHETERIZATION      CARDIOVASCULAR STRESS TEST  04/06/2010    no CP induced  by exercise. mild htn response to exercise. no arrhythmias. no EKG chg. no wall motion abnormalities. abnormal MV structure w/MR unchanged.  [facility]Y  [facility]    [facility]    CHOLECYSTECTOMY      COLONOSCOPY      DIAGNOSTIC CARDIAC CATH LAB PROCEDURE      DISSECTION GROIN N/A 12/8/2017    EXCISION OF LEFT DOTTIE SCROTAL MASS performed by Liz Louie MD at [address], COLON, DIAGNOSTIC     [address] [facility]  1-23-09    [facility]- left indirect and direct inguinal hernia    HERNIA REPAIR  2-5-07    [facility]     INGUINAL HERNIA REPAIR Left 11/3/2014    left hernia repair w mesh    KIDNEY DONATION  2005    MITRAL VALVE SURGERY  11-9-12    repair    TRANSTHORACIC ECHOCARDIOGRAM  10/24/2006    EF 65%. LV size & systolic funct normal. no regional wall motion abnormalities. normal LV wall thickness. mildly enlarged  lt atrium at 4.2 cm. mild aortic sclerosis, no stenosis. severe prolapse of the posterior mitral leaflet. no  pericardial effusion. normal LV inflow. severe eccentric MR is anteriorly directed. mild TR. SPAP 39 mmHg.  TRANSTHORACIC ECHOCARDIOGRAM  03/29/2010    EF 55-65%. LV size & systolic funct normal. no regional wall motion abnormalities. MV moderate focal thickening of the posterior leaflet, involving the leaflet from base to margin. mild chordal involvement. valve morphology consistent w/myxomatous proliferation.  holosystolic prolapse of the posterior leaflet. (9 mm). mod MR. mild TR.  TRANSTHORACIC ECHOCARDIOGRAM  02/07/2008    EF 65%. LV size & systolic funct normal. no segmental wall motion abnormalities. mild enlargement of lt atrium. mild AV sclerosis w/o stenosis. severe prolapse of the posterior leaflet of the MV. severe MR w/anteriorly directed jet. mild TR. no pericardial effusion. no thrombus or vegetation. Social History     Socioeconomic History    Marital status:      Spouse name: Tricia Kurtz Number of children: 0    Years of education: 15    Highest education level: Not on file   Occupational History     Employer: RETIRED   Tobacco Use    Smoking status: Never Smoker    Smokeless tobacco: Never Used   Vaping Use    Vaping Use: Never used   Substance and Sexual Activity    Alcohol use: Yes     Alcohol/week: 2.0 standard drinks     Types: 2 Cans of beer per week     Comment: 2 ALCOHOLIC BEVERAGES WEEKLY    Drug use: No    Sexual activity: Not on file   Other Topics Concern    Not on file   Social History Narrative    Not on file     Social Determinants of Health     Financial Resource Strain: Low Risk     Difficulty of Paying Living Expenses: Not hard at all   Food Insecurity: No Food Insecurity    Worried About Running Out of Food in the Last Year: Never true    [address] in the Last Year: Never true   Transportation Needs:     Lack of Transportation (Medical): Not on file    Lack of Transportation (Non-Medical):  Not on file   Physical Activity:     Days of Exercise per Week: Not on file    Minutes of Exercise per Session: Not on file   Stress:     Feeling of Stress : Not on file   Social Connections:     Frequency of Communication with Friends and Family: Not on file    Frequency of Social Gatherings with Friends and Family: Not on file    Attends Orthodox Services: Not on file    Active Member of Clubs or Organizations: Not on file    Attends Club or Organization Meetings: Not on file    Marital Status: Not on file   Intimate Partner Violence:     Fear of Current or Ex-Partner: Not on file    Emotionally Abused: Not on file    Physically Abused: Not on file    Sexually Abused: Not on file   Housing Stability:     Unable to Pay for Housing in the Last Year: Not on file    Number of [place] in the Last Year: Not on file    Unstable Housing in the Last Year: Not on file        Family History   Problem Relation Age of Onset    Diabetes Father     Heart Disease Father     Stroke Father     Mental Illness Mother         alzheimiers       ADVANCE DIRECTIVE: N, <no information>    Vitals:    01/10/22 1305   BP: 126/70   Site: Left Upper Arm   Position: Sitting   Cuff Size: Large Adult   Pulse: 76   Resp: 16   Weight: 182 lb 14.4 oz (83 kg)   Height: 5' 8.5\" (1.74 m)     Estimated body mass index is 27.41 kg/m² as calculated from the following:    Height as of this encounter: 5' 8.5\" (1.74 m). Weight as of this encounter: 182 lb 14.4 oz (83 kg). Physical Exam  Vitals and nursing note reviewed. Constitutional:       General: He is not in acute distress. Appearance: Normal appearance. He is well-developed. HENT:      Head: Normocephalic and atraumatic. Right Ear: Tympanic membrane normal.      Left Ear: Tympanic membrane normal.   Eyes:      Conjunctiva/sclera: Conjunctivae normal.   Cardiovascular:      Rate and Rhythm: Normal rate and regular rhythm. Heart sounds: Normal heart sounds. No murmur heard. Pulmonary:      Effort: Pulmonary effort is normal.      Breath sounds: Normal breath sounds. No wheezing, rhonchi or rales. Abdominal:      General: There is no distension. Musculoskeletal:      Cervical back: Neck supple. Skin:     General: Skin is warm and dry. Findings: No rash (on exposed surfaces). Neurological:      General: No focal deficit present. Mental Status: He is alert.    Psychiatric:         Attention and Perception: Attention normal.         Mood and Affect: Mood normal.         Speech: Speech normal.         Behavior: Behavior normal. Behavior is cooperative. Thought Content: Thought content normal.         Judgment: Judgment normal.         No flowsheet data found. Lab Results   Component Value Date    CHOL 233 11/11/2019    CHOL 197 06/21/2018    CHOL 222 05/16/2017    TRIG 309 11/11/2019    TRIG 162 06/21/2018    TRIG 223 05/16/2017    HDL 45 11/11/2019    HDL 48 06/21/2018    HDL 43 05/16/2017    LDLCALC 126 11/11/2019    LDLCALC 117 06/21/2018    LDLCALC 134 05/16/2017    GLUCOSE 114 11/11/2019    GLUCOSE 104 02/09/2012    LABA1C 6.3 11/11/2019    LABA1C 6.1 06/21/2018    LABA1C 6.2 05/16/2017       The 10-year ASCVD risk score (Thang Sánchez, et al., 2013) is: 27%    Values used to calculate the score:      Age: 66 years      Sex: Male      Is Non- : No      Diabetic: No      Tobacco smoker: No      Systolic Blood Pressure: 521 mmHg      Is BP treated: Yes      HDL Cholesterol: 45 mg/dL      Total Cholesterol: 233 mg/dL    Immunization History   Administered Date(s) Administered    Pneumococcal Conjugate 13-valent ([id number]) 05/11/2017    Pneumococcal Polysaccharide ([id number]) 06/25/2018       Health Maintenance   Topic Date Due    Hepatitis C screen  Never done    COVID-19 Vaccine (1) Never done    DTaP/Tdap/Td vaccine (1 - Tdap) Never done    Shingles Vaccine (1 of 2) Never done    A1C test (Diabetic or Prediabetic)  11/11/2020    Lipid screen  11/11/2020    TSH testing  11/11/2020    Annual Wellness Visit (AWV)  11/12/2020    Flu vaccine (1) Never done    Depression Screen  09/30/2022    Colon cancer screen colonoscopy  09/15/2025    Pneumococcal 65+ years Vaccine  Completed    Hepatitis A vaccine  Aged Out    Hepatitis B vaccine  Aged Out    Hib vaccine  Aged Out    Meningococcal (ACWY) vaccine  Aged Out          ASSESSMENT/PLAN:  1.  Routine general medical examination at a health care facility  2. Post herpetic neuralgia  3. Pure hypercholesterolemia  -     Lipid Panel w/ Reflex Direct LDL; Future  -     Comprehensive Metabolic Panel; Future  4. PUD (peptic ulcer disease)  5. Essential hypertension  -     CBC Auto Differential; Future  6. Hypothyroidism, unspecified type  -     TSH RFX ON ABNORMAL TO FREE T4; Future  7. S/P MVR (mitral valve repair)  8. History of nephrectomy  -     Comprehensive Metabolic Panel; Future  9. IFG (impaired fasting glucose)  -     Comprehensive Metabolic Panel; Future  -     Hemoglobin A1C; Future  10. Screening for prostate cancer  -     PSA screening; Future    -  Chronic medical problems stable  -  Continue current medications  -  Follow up with specialists as scheduled  -  Check labs, will call      Return for Medicare Annual Wellness Visit in 1 year. An electronic signature was used to authenticate this note. --Nell Chong DO on 1/10/2022 at 2:29 PM    Medicare Annual Wellness Visit  Name: Bisi Blunt Date: 1/10/2022   MRN: [id number] Sex: Male   Age: 66 y.o. Ethnicity: Non- / Non    : 1950 Race: White (non-)      Krysta Beaulieu is here for Medicare AWV    Screenings for behavioral, psychosocial and functional/safety risks, and cognitive dysfunction are all negative except as indicated below. These results, as well as other patient data from the [address] form, are documented in Flowsheets linked to this Encounter. Allergies   Allergen Reactions    Tape [place] Tape] Dermatitis     Plastic tape         Prior to Visit Medications    Medication Sig Taking?  Authorizing Provider   sildenafil (VIAGRA) 100 MG tablet Take 1 tablet by mouth as needed for Erectile Dysfunction Yes Nell Chong DO   nortriptyline (PAMELOR) 50 MG capsule TAKE 1 CAPSULE NIGHTLY Yes FRANCESCO Leyva - CNP   fenofibrate (TRICOR) 145 MG tablet TAKE 1 TABLET DAILY Yes Anson Ava, DO   pantoprazole (PROTONIX) 40 MG tablet TAKE 1 TABLET TWICE A DAY BEFORE MEALS Yes Anson Ava, DO   atorvastatin (LIPITOR) 10 MG tablet TAKE 1 TABLET DAILY Yes Anson Ava, DO   amLODIPine (NORVASC) 2.5 MG tablet TAKE 1 TABLET DAILY Yes Anson Ava, DO   SYNTHROID 100 MCG tablet TAKE 1 TABLET DAILY Yes Anson Ava, DO   gabapentin (NEURONTIN) 800 MG tablet Take 1 tablet by mouth 3 times daily. Patient taking differently: Take 800 mg by mouth 3 times daily. 1 to 3x daily PRN Yes Anson Ava, DO   aspirin EC 81 MG EC tablet Take 1 tablet by mouth daily Yes Anson Ava, DO         Past Medical History:   Diagnosis Date    CKD (chronic kidney disease) stage 3, GFR 30-59 ml/min ([facility])     only one kidney     Hyperlipidemia     Hypertension     Mitral valve regurgitation     MOD-SVERE s/p valvuloplasty    Other sleep disturbances     Thyroid disease        Past Surgical History:   Procedure Laterality Date    CARDIAC CATHETERIZATION      CARDIOVASCULAR STRESS TEST  04/06/2010    no CP induced  by exercise. mild htn response to exercise. no arrhythmias. no EKG chg. no wall motion abnormalities. abnormal MV structure w/MR unchanged.  [facility]LE[facility]Y  2004    [facility]    CHOLECYSTECTOMY      COLONOSCOPY      DIAGNOSTIC CARDIAC CATH LAB PROCEDURE      DISSECTION GROIN N/A 12/8/2017    EXCISION OF LEFT DOTTIE SCROTAL MASS performed by Alicia Fong MD at [facility], COLON, DIAGNOSTIC     [address] [facility]  1-23-09    [facility]- left indirect and direct inguinal hernia    HERNIA REPAIR  2-5-07    Right inguinal hernia- [facility]     INGUINAL HERNIA REPAIR Left 11/3/2014    left hernia repair w mesh    KIDNEY DONATION  2005    MITRAL VALVE SURGERY  11-9-12    repair    TRANSTHORACIC ECHOCARDIOGRAM  10/24/2006    EF 65%. LV size & systolic funct normal. no regional wall motion abnormalities. normal LV wall thickness. mildly enlarged  lt atrium at 4.2 cm. mild aortic sclerosis, no stenosis. severe prolapse of the posterior mitral leaflet. no  pericardial effusion. normal LV inflow. severe eccentric MR is anteriorly directed. mild TR. SPAP 39 mmHg.  TRANSTHORACIC ECHOCARDIOGRAM  03/29/2010    EF 55-65%. LV size & systolic funct normal. no regional wall motion abnormalities. MV moderate focal thickening of the posterior leaflet, involving the leaflet from base to margin. mild chordal involvement. valve morphology consistent w/myxomatous proliferation. holosystolic prolapse of the posterior leaflet. (9 mm). mod MR. mild TR.  TRANSTHORACIC ECHOCARDIOGRAM  02/07/2008    EF 65%. LV size & systolic funct normal. no segmental wall motion abnormalities. mild enlargement of lt atrium. mild AV sclerosis w/o stenosis. severe prolapse of the posterior leaflet of the MV. severe MR w/anteriorly directed jet. mild TR. no pericardial effusion. no thrombus or vegetation. Family History   Problem Relation Age of Onset    Diabetes Father     Heart Disease Father     Stroke Father     Mental Illness Mother         alzheimiers       CareTeam (Including outside providers/suppliers regularly involved in providing care):   Patient Care Team:  Angeles Crouch DO as PCP - General (Family Medicine)  Angeles Crouch DO as PCP - [facility] Empaneled Provider  Rosa Curry DO as Consulting Physician (Nephrology)    Wt Readings from Last 3 Encounters:   01/10/22 182 lb 14.4 oz (83 kg)   09/30/21 174 lb 11.2 oz (79.2 kg)   09/29/20 183 lb 8 oz (83.2 kg)     Vitals:    01/10/22 1305   BP: 126/70   Site: Left Upper Arm   Position: Sitting   Cuff Size: Large Adult   Pulse: 76   Resp: 16   Weight: 182 lb 14.4 oz (83 kg)   Height: 5' 8.5\" (1.74 m)     Body mass index is 27.41 kg/m². Based upon direct observation of the patient, evaluation of cognition reveals recent and remote memory intact.       Patient's complete Health Risk Assessment and screening values have been reviewed and are found in Flowsheets. The following problems were reviewed today and where indicated follow up appointments were made and/or referrals ordered. Positive Risk Factor Screenings with Interventions:              Hearing/Vision:  No exam data present  Hearing/Vision  Do you or your family notice any trouble with your hearing that hasn't been managed with hearing aids?: No  Do you have difficulty driving, watching TV, or doing any of your daily activities because of your eyesight?: No  Have you had an eye exam within the past year?: (!) No  Hearing/Vision Interventions:  · Vision concerns:  patient encouraged to make appointment with his/her eye specialist        Personalized Preventive Plan   Current Health Maintenance Status  Immunization History   Administered Date(s) Administered    Pneumococcal Conjugate 13-valent ([id number]) 05/11/2017    Pneumococcal Polysaccharide ([id number]) 06/25/2018        Health Maintenance   Topic Date Due    Hepatitis C screen  Never done    COVID-19 Vaccine (1) Never done    DTaP/Tdap/Td vaccine (1 - Tdap) Never done    Shingles Vaccine (1 of 2) Never done    A1C test (Diabetic or Prediabetic)  11/11/2020    Lipid screen  11/11/2020    TSH testing  11/11/2020    Annual Wellness Visit (AWV)  11/12/2020    Flu vaccine (1) Never done    Depression Screen  09/30/2022    Colon cancer screen colonoscopy  09/15/2025    Pneumococcal 65+ years Vaccine  Completed    Hepatitis A vaccine  Aged Out    Hepatitis B vaccine  Aged Out    Hib vaccine  Aged Out    Meningococcal (ACWY) vaccine  Aged Out     Recommendations for [employer] Due: see orders and patient instructions/AVS.  . Recommended screening schedule for the next 5-10 years is provided to the patient in written form: see Patient Instructions/AVS.    Noman Peralta was seen today for medicare awv.     Diagnoses and all orders for this visit:    Routine general medical examination at a health care facility    Post herpetic neuralgia    Pure hypercholesterolemia  -     Lipid Panel w/ Reflex Direct LDL; Future  -     Comprehensive Metabolic Panel; Future    PUD (peptic ulcer disease)    Essential hypertension  -     CBC Auto Differential; Future    Hypothyroidism, unspecified type  -     TSH RFX ON ABNORMAL TO FREE T4; Future    S/P MVR (mitral valve repair)    History of nephrectomy  -     Comprehensive Metabolic Panel; Future    IFG (impaired fasting glucose)  -     Comprehensive Metabolic Panel; Future  -     Hemoglobin A1C; Future    Screening for prostate cancer  -     PSA screening; Future    Other orders  -     sildenafil (VIAGRA) 100 MG tablet;  Take 1 tablet by mouth as needed for Erectile Dysfunction

## 2022-01-13 ENCOUNTER — HOSPITAL ENCOUNTER (OUTPATIENT)
Age: 72
Discharge: HOME | End: 2022-01-13
Payer: MEDICARE

## 2022-01-13 DIAGNOSIS — R73.01 IFG (IMPAIRED FASTING GLUCOSE): ICD-10-CM

## 2022-01-13 DIAGNOSIS — I10 ESSENTIAL HYPERTENSION: ICD-10-CM

## 2022-01-13 DIAGNOSIS — Z90.5 HISTORY OF NEPHRECTOMY: ICD-10-CM

## 2022-01-13 DIAGNOSIS — E03.9 HYPOTHYROIDISM, UNSPECIFIED TYPE: ICD-10-CM

## 2022-01-13 DIAGNOSIS — Z12.5 SCREENING FOR PROSTATE CANCER: ICD-10-CM

## 2022-01-13 DIAGNOSIS — E78.00 PURE HYPERCHOLESTEROLEMIA: ICD-10-CM

## 2022-01-13 LAB
ALBUMIN SERPL-MCNC: 4.8 G/DL (ref 3.5–5.1)
ALP BLD-CCNC: 124 U/L (ref 38–126)
ALT SERPL-CCNC: 42 U/L (ref 11–66)
ANION GAP SERPL CALCULATED.3IONS-SCNC: 12 MEQ/L (ref 8–16)
AST SERPL-CCNC: 32 U/L (ref 5–40)
AVERAGE GLUCOSE: 120 MG/DL (ref 70–126)
BASOPHILS # BLD: 0.6 %
BASOPHILS ABSOLUTE: 0.1 THOU/MM3 (ref 0–0.1)
BILIRUB SERPL-MCNC: 0.9 MG/DL (ref 0.3–1.2)
BUN BLDV-MCNC: 21 MG/DL (ref 7–22)
CALCIUM SERPL-MCNC: 10.3 MG/DL (ref 8.5–10.5)
CHLORIDE BLD-SCNC: 99 MEQ/L (ref 98–111)
CHOLESTEROL, TOTAL: 218 MG/DL (ref 100–199)
CO2: 29 MEQ/L (ref 23–33)
CREAT SERPL-MCNC: 1.3 MG/DL (ref 0.4–1.2)
EOSINOPHIL # BLD: 4.4 %
EOSINOPHILS ABSOLUTE: 0.4 THOU/MM3 (ref 0–0.4)
ERYTHROCYTE [DISTWIDTH] IN BLOOD BY AUTOMATED COUNT: 13.5 % (ref 11.5–14.5)
ERYTHROCYTE [DISTWIDTH] IN BLOOD BY AUTOMATED COUNT: 46.4 FL (ref 35–45)
GFR SERPL CREATININE-BSD FRML MDRD: 54 ML/MIN/1.73M2
GLUCOSE BLD-MCNC: 109 MG/DL (ref 70–108)
HBA1C MFR BLD: 6 % (ref 4.4–6.4)
HCT VFR BLD CALC: 52 % (ref 42–52)
HDLC SERPL-MCNC: 56 MG/DL
HEMOGLOBIN: 17.5 GM/DL (ref 14–18)
IMMATURE GRANS (ABS): 0.06 THOU/MM3 (ref 0–0.07)
IMMATURE GRANULOCYTES: 0.6 %
LDL CHOLESTEROL CALCULATED: 118 MG/DL
LYMPHOCYTES # BLD: 24.6 %
LYMPHOCYTES ABSOLUTE: 2.3 THOU/MM3 (ref 1–4.8)
MCH RBC QN AUTO: 31.4 PG (ref 26–33)
MCHC RBC AUTO-ENTMCNC: 33.7 GM/DL (ref 32.2–35.5)
MCV RBC AUTO: 93.4 FL (ref 80–94)
MONOCYTES # BLD: 10.2 %
MONOCYTES ABSOLUTE: 0.9 THOU/MM3 (ref 0.4–1.3)
NUCLEATED RED BLOOD CELLS: 0 /100 WBC
PLATELET # BLD: 252 THOU/MM3 (ref 130–400)
PMV BLD AUTO: 11.8 FL (ref 9.4–12.4)
POTASSIUM SERPL-SCNC: 4.5 MEQ/L (ref 3.5–5.2)
PROSTATE SPECIFIC ANTIGEN: 1.6 NG/ML (ref 0–1)
RBC # BLD: 5.57 MILL/MM3 (ref 4.7–6.1)
SEG NEUTROPHILS: 59.6 %
SEGMENTED NEUTROPHILS ABSOLUTE COUNT: 5.5 THOU/MM3 (ref 1.8–7.7)
SODIUM BLD-SCNC: 140 MEQ/L (ref 135–145)
T4 FREE: 1.3 NG/DL (ref 0.93–1.76)
TOTAL PROTEIN: 7.7 G/DL (ref 6.1–8)
TRIGL SERPL-MCNC: 221 MG/DL (ref 0–199)
TSH SERPL DL<=0.05 MIU/L-ACNC: 4.75 UIU/ML (ref 0.4–4.2)
WBC # BLD: 9.3 THOU/MM3 (ref 4.8–10.8)

## 2022-01-13 PROCEDURE — 85025 COMPLETE CBC W/AUTO DIFF WBC: CPT

## 2022-01-13 PROCEDURE — 83036 HEMOGLOBIN GLYCOSYLATED A1C: CPT

## 2022-01-13 PROCEDURE — 84443 ASSAY THYROID STIM HORMONE: CPT

## 2022-01-13 PROCEDURE — G0103 PSA SCREENING: HCPCS

## 2022-01-13 PROCEDURE — 80053 COMPREHEN METABOLIC PANEL: CPT

## 2022-01-13 PROCEDURE — 80061 LIPID PANEL: CPT

## 2022-01-13 PROCEDURE — 84439 ASSAY OF FREE THYROXINE: CPT

## 2022-01-13 PROCEDURE — 36415 COLL VENOUS BLD VENIPUNCTURE: CPT

## 2022-01-17 DIAGNOSIS — E03.9 HYPOTHYROIDISM, UNSPECIFIED TYPE: Primary | ICD-10-CM

## 2022-01-17 RX ORDER — LEVOTHYROXINE SODIUM 112 MCG
112 TABLET ORAL DAILY
Qty: 30 TABLET | Refills: 2 | Status: SHIPPED | OUTPATIENT
Start: 2022-01-17

## 2022-04-27 ENCOUNTER — TELEPHONE (OUTPATIENT)
Dept: FAMILY MEDICINE CLINIC | Age: 72
End: 2022-04-27

## 2022-04-27 RX ORDER — GABAPENTIN 800 MG/1
800 TABLET ORAL 3 TIMES DAILY
Qty: 270 TABLET | Refills: 3 | Status: SHIPPED | OUTPATIENT
Start: 2022-04-27 | End: 2023-04-27

## 2022-08-04 ENCOUNTER — OFFICE VISIT (OUTPATIENT)
Dept: FAMILY MEDICINE CLINIC | Age: 72
End: 2022-08-04
Payer: MEDICARE

## 2022-08-04 VITALS
BODY MASS INDEX: 27.36 KG/M2 | WEIGHT: 182.6 LBS | SYSTOLIC BLOOD PRESSURE: 136 MMHG | RESPIRATION RATE: 15 BRPM | HEART RATE: 80 BPM | DIASTOLIC BLOOD PRESSURE: 80 MMHG | TEMPERATURE: 97.7 F

## 2022-08-04 DIAGNOSIS — B02.29 POST HERPETIC NEURALGIA: Primary | ICD-10-CM

## 2022-08-04 PROCEDURE — 99213 OFFICE O/P EST LOW 20 MIN: CPT | Performed by: FAMILY MEDICINE

## 2022-08-04 PROCEDURE — 1123F ACP DISCUSS/DSCN MKR DOCD: CPT | Performed by: FAMILY MEDICINE

## 2022-08-04 NOTE — PROGRESS NOTES
Matha Gilford (:  1950) is a [address] [facility] y.o. male,Established patient, here for evaluation of the following chief complaint(s):  Rash (? Shingles head, forehead and temples )        Subjective   SUBJECTIVE/OBJECTIVE:  HPI:    Chief Complaint   Patient presents with    Rash     ? Shingles head, forehead and temples      Pt presents today to discuss his PHN. Recently came back. Located on the left side if his head. Never fully goes away, will have sharp pain in the left temple that comes and goes. Just wanted to discuss and make sure nothing else was going on. Patient Active Problem List   Diagnosis    Hyperlipidemia    Hypothyroid    HTN (hypertension)    History of nephrectomy    S/P MVR (mitral valve repair)    Lipoma    Scrotal mass     Past Surgical History:   Procedure Laterality Date    CARDIAC CATHETERIZATION      CARDIOVASCULAR STRESS TEST  2010    no CP induced  by exercise. mild htn response to exercise. no arrhythmias. no EKG chg. no wall motion abnormalities. abnormal MV structure w/MR unchanged. CHOLECYSTECTOMY      [facility]    CHOLECYSTECTOMY      COLONOSCOPY      DIAGNOSTIC CARDIAC CATH LAB PROCEDURE      DISSECTION GROIN N/A 2017    EXCISION OF LEFT DOTTIE SCROTAL MASS performed by Kwame Oliveros MD at [facility], COLON, [address]  09    [facility]- left indirect and direct inguinal hernia    HERNIA REPAIR  07    Right inguinal hernia- [facility]     INGUINAL HERNIA REPAIR Left 11/3/2014    left hernia repair w mesh    KIDNEY DONATION      MITRAL VALVE SURGERY  12    repair    TRANSTHORACIC ECHOCARDIOGRAM  10/24/2006    EF 65%. LV size & systolic funct normal. no regional wall motion abnormalities. normal LV wall thickness. mildly enlarged  lt atrium at 4.2 cm. mild aortic sclerosis, no stenosis. severe prolapse of the posterior mitral leaflet. no  pericardial effusion. normal LV inflow.  severe eccentric MR is anteriorly directed. mild TR. SPAP 39 mmHg. TRANSTHORACIC ECHOCARDIOGRAM  03/29/2010    EF 55-65%. LV size & systolic funct normal. no regional wall motion abnormalities. MV moderate focal thickening of the posterior leaflet, involving the leaflet from base to margin. mild chordal involvement. valve morphology consistent w/myxomatous proliferation. holosystolic prolapse of the posterior leaflet. (9 mm). mod MR. mild TR.    TRANSTHORACIC ECHOCARDIOGRAM  02/07/2008    EF 65%. LV size & systolic funct normal. no segmental wall motion abnormalities. mild enlargement of lt atrium. mild AV sclerosis w/o stenosis. severe prolapse of the posterior leaflet of the MV. severe MR w/anteriorly directed jet. mild TR. no pericardial effusion. no thrombus or vegetation. Social History     Tobacco Use    Smoking status: Never    Smokeless tobacco: Never   Vaping Use    Vaping Use: Never used   Substance Use Topics    Alcohol use: Yes     Alcohol/week: 2.0 standard drinks     Types: 2 Cans of beer per week     Comment: 2 ALCOHOLIC BEVERAGES WEEKLY    Drug use: No         Review of Systems   Constitutional: Negative. HENT: Negative. Respiratory: Negative. Cardiovascular: Negative. Gastrointestinal: Negative. Musculoskeletal: Negative. Neurological:  Positive for headaches. All other systems reviewed and are negative. Objective   Physical Exam  Vitals and nursing note reviewed. Constitutional:       General: He is not in acute distress. Appearance: Normal appearance. He is well-developed. HENT:      Head: Normocephalic and atraumatic. Right Ear: Tympanic membrane normal.      Left Ear: Tympanic membrane normal.   Eyes:      Conjunctiva/sclera: Conjunctivae normal.   Cardiovascular:      Rate and Rhythm: Normal rate and regular rhythm. Heart sounds: Normal heart sounds. No murmur heard. Pulmonary:      Effort: Pulmonary effort is normal.      Breath sounds: Normal breath sounds.  No wheezing, rhonchi or rales. Abdominal:      General: There is no distension. Musculoskeletal:      Cervical back: Neck supple. Skin:     General: Skin is warm and dry. Findings: No rash (on exposed surfaces). Neurological:      General: No focal deficit present. Mental Status: He is alert. Psychiatric:         Attention and Perception: Attention normal.         Mood and Affect: Mood normal.         Speech: Speech normal.         Behavior: Behavior normal. Behavior is cooperative. Thought Content: Thought content normal.         Judgment: Judgment normal.             ASSESSMENT/PLAN:  1. Post herpetic neuralgia    -  stable in nature  -  continue Pamelor and Neurontin    Return if symptoms worsen or fail to improve. An electronic signature was used to authenticate this note.     --Dalia Kelly, DO

## 2022-08-08 ASSESSMENT — ENCOUNTER SYMPTOMS
GASTROINTESTINAL NEGATIVE: 1
RESPIRATORY NEGATIVE: 1

## 2022-11-01 ENCOUNTER — TELEPHONE (OUTPATIENT)
Dept: FAMILY MEDICINE CLINIC | Age: 72
End: 2022-11-01

## 2022-11-01 NOTE — TELEPHONE ENCOUNTER
----- Message from Neida Aguilar MA sent at 11/1/2022  3:58 PM EDT -----  Subject: Message to Provider    QUESTIONS  Information for Provider? CARIDAD Pt did contact provider in [facility] and gave them   the fax number to send records. Per pt if you do not receive them please   contact him. Call home or his friend at [phone]  ---------------------------------------------------------------------------  --------------  [id number]  [id number]; OK to leave message on voicemail  ---------------------------------------------------------------------------  --------------  SCRIPT ANSWERS  Relationship to Patient?  Self

## 2022-11-01 NOTE — TELEPHONE ENCOUNTER
----- Message from Bedford Duverney sent at 11/1/2022  2:20 PM EDT -----  Subject: Message to Provider    QUESTIONS  Information for Provider? Patient states he did a blood test and some   testing in [place], States it was with Dr. Homa Pierre, phone number is   [phone] Fax? [phone] states Dr. Daniella Ojeda stated he had shingles about   4-5 years ago and he has been experiencing headaches which CP is aware of. Pt would like to know if you all have received all the info. Also states   that provider in [place] wants him to se a neurologist but no one ca see   him down there, states he will com back to town to have an appt. Please   call pt.   ---------------------------------------------------------------------------  --------------  Vikram Ag INFO  [phone]; Do not leave any message, patient will call back for answer  ---------------------------------------------------------------------------  --------------  SCRIPT ANSWERS  Relationship to Patient?  Self

## 2022-11-03 ENCOUNTER — TELEPHONE (OUTPATIENT)
Dept: FAMILY MEDICINE CLINIC | Age: 72
End: 2022-11-03

## 2022-11-03 DIAGNOSIS — R51.9 RECURRENT HEADACHE: ICD-10-CM

## 2022-11-03 DIAGNOSIS — B02.29 POST HERPETIC NEURALGIA: Primary | ICD-10-CM

## 2022-11-03 NOTE — TELEPHONE ENCOUNTER
----- Message from Denise Gonzalez sent at 11/3/2022  3:40 PM EDT -----  Subject: Referral Request    Reason for referral request? neurologist   Provider patient wants to be referred to(if known):     Provider Phone Number(if known):     Additional Information for Provider?   ---------------------------------------------------------------------------  --------------  [id number]    [phone]; OK to leave message on voicemail  ---------------------------------------------------------------------------  --------------

## 2022-11-03 NOTE — TELEPHONE ENCOUNTER
Spoke with patient asking for a referral to neurology for on going headaches. Per patient records from [place] were faxed to the office yesterday 11/2/22. Please review and advise for a call back to patient. Aware it will be am Friday 11/4/22.

## 2022-11-10 ENCOUNTER — TELEPHONE (OUTPATIENT)
Dept: FAMILY MEDICINE CLINIC | Age: 72
End: 2022-11-10

## 2022-11-10 DIAGNOSIS — K27.9 PUD (PEPTIC ULCER DISEASE): ICD-10-CM

## 2022-11-10 RX ORDER — PANTOPRAZOLE SODIUM 40 MG/1
TABLET, DELAYED RELEASE ORAL
Qty: 180 TABLET | Refills: 3 | Status: SHIPPED | OUTPATIENT
Start: 2022-11-10
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
## 2022-11-10 NOTE — TELEPHONE ENCOUNTER
Michelle Ortega with Dr. Srikanth Parker office ext [phone] called regarding the referral we sent to them 11/3/22. See 11/3 Jaiden Ojeda is asking if we have received the [place] records yet because she could not find them scanned in [employer]. Advised nothing received form the doctor in [place] yet to date. She voiced understanding.     Upon further review of pt chart, Michelle Ortega says she can just use the office notes from Dr. Anisa Eid on 8/4/22 with the diagnosis post herpetic neuralgia with the referral.

## 2022-11-15 NOTE — TELEPHONE ENCOUNTER
Spoke with Dr. Josephine Travis office [facility] records were not received. Karely Cao will re- fax records to the office today.

## 2022-11-28 DIAGNOSIS — E78.00 PURE HYPERCHOLESTEROLEMIA: ICD-10-CM

## 2022-11-28 RX ORDER — FENOFIBRATE 145 MG/1
TABLET, COATED ORAL
Qty: 90 TABLET | Refills: 3 | Status: SHIPPED | OUTPATIENT
Start: 2022-11-28

## 2022-12-20 ENCOUNTER — TELEPHONE (OUTPATIENT)
Dept: FAMILY MEDICINE CLINIC | Age: 72
End: 2022-12-20

## 2022-12-20 RX ORDER — GABAPENTIN 800 MG/1
800 TABLET ORAL 3 TIMES DAILY
Qty: 21 TABLET | Refills: 0 | Status: SHIPPED | OUTPATIENT
Start: 2022-12-20 | End: 2022-12-27

## 2022-12-20 NOTE — TELEPHONE ENCOUNTER
[facility] lost shipment of his medication, sending out new supply but not going to be here till 25th. Can he have a week supply of Gabapentin 800mg sent into his local pharmacy?  - [address]

## 2022-12-23 DIAGNOSIS — B02.29 POST HERPETIC NEURALGIA: ICD-10-CM

## 2022-12-23 RX ORDER — NORTRIPTYLINE HYDROCHLORIDE 50 MG/1
CAPSULE ORAL
Qty: 90 CAPSULE | Refills: 3 | Status: SHIPPED | OUTPATIENT
Start: 2022-12-23

## 2023-01-11 ENCOUNTER — HOSPITAL ENCOUNTER (OUTPATIENT)
Age: 73
Discharge: HOME | End: 2023-01-11
Payer: MEDICARE

## 2023-01-11 ENCOUNTER — OFFICE VISIT (OUTPATIENT)
Dept: NEUROLOGY | Age: 73
End: 2023-01-11
Payer: MEDICARE

## 2023-01-11 VITALS
BODY MASS INDEX: 27.35 KG/M2 | OXYGEN SATURATION: 95 % | WEIGHT: 191 LBS | HEIGHT: 70 IN | SYSTOLIC BLOOD PRESSURE: 142 MMHG | HEART RATE: 71 BPM | DIASTOLIC BLOOD PRESSURE: 88 MMHG

## 2023-01-11 DIAGNOSIS — G50.0 LEFT-SIDED TRIGEMINAL NEURALGIA: ICD-10-CM

## 2023-01-11 DIAGNOSIS — Z11.59 ENCOUNTER FOR SCREENING FOR OTHER VIRAL DISEASES: ICD-10-CM

## 2023-01-11 DIAGNOSIS — R51.9 LEFT-SIDED FACE PAIN: ICD-10-CM

## 2023-01-11 DIAGNOSIS — R51.9 LEFT-SIDED FACE PAIN: Primary | ICD-10-CM

## 2023-01-11 PROCEDURE — 1123F ACP DISCUSS/DSCN MKR DOCD: CPT | Performed by: PSYCHIATRY & NEUROLOGY

## 2023-01-11 PROCEDURE — 86663 EPSTEIN-BARR ANTIBODY: CPT

## 2023-01-11 PROCEDURE — 86696 HERPES SIMPLEX TYPE 2 TEST: CPT

## 2023-01-11 PROCEDURE — 3074F SYST BP LT 130 MM HG: CPT | Performed by: PSYCHIATRY & NEUROLOGY

## 2023-01-11 PROCEDURE — 86664 EPSTEIN-BARR NUCLEAR ANTIGEN: CPT

## 2023-01-11 PROCEDURE — 86665 EPSTEIN-BARR CAPSID VCA: CPT

## 2023-01-11 PROCEDURE — 3078F DIAST BP <80 MM HG: CPT | Performed by: PSYCHIATRY & NEUROLOGY

## 2023-01-11 PROCEDURE — 99204 OFFICE O/P NEW MOD 45 MIN: CPT | Performed by: PSYCHIATRY & NEUROLOGY

## 2023-01-11 PROCEDURE — 86787 VARICELLA-ZOSTER ANTIBODY: CPT

## 2023-01-11 PROCEDURE — 36415 COLL VENOUS BLD VENIPUNCTURE: CPT

## 2023-01-11 RX ORDER — CARBAMAZEPINE 100 MG/1
100 TABLET, CHEWABLE ORAL 2 TIMES DAILY
Qty: 60 TABLET | Refills: 3 | Status: SHIPPED | OUTPATIENT
Start: 2023-01-11 | End: 2023-01-27 | Stop reason: SDUPTHER

## 2023-01-11 RX ORDER — GABAPENTIN 400 MG/1
400 CAPSULE ORAL 3 TIMES DAILY
Qty: 90 CAPSULE | Refills: 2 | Status: SHIPPED | OUTPATIENT
Start: 2023-01-11 | End: 2023-04-11

## 2023-01-11 RX ORDER — CELECOXIB 200 MG/1
CAPSULE ORAL
COMMUNITY
Start: 2022-11-19

## 2023-01-11 NOTE — PATIENT INSTRUCTIONS
Obtain MRI brain report and images from [facility] Tegretol 100 mg two times a day  Change Gabapentin to 400 mg three times a day  VZV, HSV, EBV titers  Call with any new symptoms or concerns. Follow up in 1 month.

## 2023-01-13 LAB
EPSTEIN-BARR VIRUS ANTIBODIES: NORMAL
HSV TYPE 2 GLYCOPROTEIN G-SPECIFIC AB, IGG: 0.07 IV
VZV IGG SER QL IA: 1.94

## 2023-01-14 LAB — VARICELLA ZOSTER AB IGM: 0.05 ISR

## 2023-01-18 ENCOUNTER — NURSE ONLY (OUTPATIENT)
Dept: LAB | Age: 73
End: 2023-01-18

## 2023-01-18 ENCOUNTER — OFFICE VISIT (OUTPATIENT)
Dept: FAMILY MEDICINE CLINIC | Age: 73
End: 2023-01-18

## 2023-01-18 VITALS
DIASTOLIC BLOOD PRESSURE: 94 MMHG | BODY MASS INDEX: 27.39 KG/M2 | HEART RATE: 84 BPM | SYSTOLIC BLOOD PRESSURE: 152 MMHG | RESPIRATION RATE: 16 BRPM | WEIGHT: 190.9 LBS

## 2023-01-18 DIAGNOSIS — E03.9 HYPOTHYROIDISM, UNSPECIFIED TYPE: ICD-10-CM

## 2023-01-18 DIAGNOSIS — B02.29 POST HERPETIC NEURALGIA: Primary | ICD-10-CM

## 2023-01-18 DIAGNOSIS — R73.01 IFG (IMPAIRED FASTING GLUCOSE): ICD-10-CM

## 2023-01-18 DIAGNOSIS — I10 ESSENTIAL HYPERTENSION: ICD-10-CM

## 2023-01-18 DIAGNOSIS — Z90.5 HISTORY OF NEPHRECTOMY: ICD-10-CM

## 2023-01-18 DIAGNOSIS — E78.00 PURE HYPERCHOLESTEROLEMIA: ICD-10-CM

## 2023-01-18 DIAGNOSIS — Z12.5 SCREENING FOR PROSTATE CANCER: ICD-10-CM

## 2023-01-18 DIAGNOSIS — Z98.890 S/P MVR (MITRAL VALVE REPAIR): ICD-10-CM

## 2023-01-18 LAB
ALBUMIN SERPL-MCNC: 4.6 G/DL (ref 3.5–5.1)
ALP BLD-CCNC: 126 U/L (ref 38–126)
ALT SERPL-CCNC: 35 U/L (ref 11–66)
ANION GAP SERPL CALCULATED.3IONS-SCNC: 13 MEQ/L (ref 8–16)
AST SERPL-CCNC: 24 U/L (ref 5–40)
AVERAGE GLUCOSE: 120 MG/DL (ref 70–126)
BASOPHILS # BLD: 1 %
BASOPHILS ABSOLUTE: 0.1 THOU/MM3 (ref 0–0.1)
BILIRUB SERPL-MCNC: 0.6 MG/DL (ref 0.3–1.2)
BUN BLDV-MCNC: 18 MG/DL (ref 7–22)
CALCIUM SERPL-MCNC: 9.6 MG/DL (ref 8.5–10.5)
CHLORIDE BLD-SCNC: 103 MEQ/L (ref 98–111)
CHOLESTEROL, TOTAL: 320 MG/DL (ref 100–199)
CO2: 28 MEQ/L (ref 23–33)
CREAT SERPL-MCNC: 1.2 MG/DL (ref 0.4–1.2)
EOSINOPHIL # BLD: 5.5 %
EOSINOPHILS ABSOLUTE: 0.4 THOU/MM3 (ref 0–0.4)
ERYTHROCYTE [DISTWIDTH] IN BLOOD BY AUTOMATED COUNT: 14.5 % (ref 11.5–14.5)
ERYTHROCYTE [DISTWIDTH] IN BLOOD BY AUTOMATED COUNT: 49.4 FL (ref 35–45)
GFR SERPL CREATININE-BSD FRML MDRD: > 60 ML/MIN/1.73M2
GLUCOSE BLD-MCNC: 106 MG/DL (ref 70–108)
HBA1C MFR BLD: 6 % (ref 4.4–6.4)
HCT VFR BLD CALC: 52.9 % (ref 42–52)
HDLC SERPL-MCNC: 44 MG/DL
HEMOGLOBIN: 16.9 GM/DL (ref 14–18)
IMMATURE GRANS (ABS): 0.04 THOU/MM3 (ref 0–0.07)
IMMATURE GRANULOCYTES: 0.5 %
LDL CHOLESTEROL CALCULATED: ABNORMAL MG/DL
LDL CHOLESTEROL DIRECT: 206.16 MG/DL
LYMPHOCYTES # BLD: 23.8 %
LYMPHOCYTES ABSOLUTE: 1.9 THOU/MM3 (ref 1–4.8)
MCH RBC QN AUTO: 29.8 PG (ref 26–33)
MCHC RBC AUTO-ENTMCNC: 31.9 GM/DL (ref 32.2–35.5)
MCV RBC AUTO: 93.3 FL (ref 80–94)
MONOCYTES # BLD: 9.3 %
MONOCYTES ABSOLUTE: 0.7 THOU/MM3 (ref 0.4–1.3)
NUCLEATED RED BLOOD CELLS: 0 /100 WBC
PLATELET # BLD: 217 THOU/MM3 (ref 130–400)
PMV BLD AUTO: 11.5 FL (ref 9.4–12.4)
POTASSIUM SERPL-SCNC: 4.8 MEQ/L (ref 3.5–5.2)
PROSTATE SPECIFIC ANTIGEN: 0.7 NG/ML (ref 0–1)
RBC # BLD: 5.67 MILL/MM3 (ref 4.7–6.1)
SEG NEUTROPHILS: 59.9 %
SEGMENTED NEUTROPHILS ABSOLUTE COUNT: 4.7 THOU/MM3 (ref 1.8–7.7)
SODIUM BLD-SCNC: 144 MEQ/L (ref 135–145)
T4 FREE: 1.01 NG/DL (ref 0.93–1.76)
TOTAL PROTEIN: 7.4 G/DL (ref 6.1–8)
TRIGL SERPL-MCNC: 465 MG/DL (ref 0–199)
TSH SERPL DL<=0.05 MIU/L-ACNC: 6.4 UIU/ML (ref 0.4–4.2)
WBC # BLD: 7.9 THOU/MM3 (ref 4.8–10.8)

## 2023-01-18 RX ORDER — AMLODIPINE BESYLATE 5 MG/1
5 TABLET ORAL DAILY
Qty: 90 TABLET | Refills: 3 | Status: SHIPPED | OUTPATIENT
Start: 2023-01-18

## 2023-01-18 SDOH — ECONOMIC STABILITY: FOOD INSECURITY: WITHIN THE PAST 12 MONTHS, YOU WORRIED THAT YOUR FOOD WOULD RUN OUT BEFORE YOU GOT MONEY TO BUY MORE.: NEVER TRUE

## 2023-01-18 SDOH — ECONOMIC STABILITY: FOOD INSECURITY: WITHIN THE PAST 12 MONTHS, THE FOOD YOU BOUGHT JUST DIDN'T LAST AND YOU DIDN'T HAVE MONEY TO GET MORE.: NEVER TRUE

## 2023-01-18 ASSESSMENT — PATIENT HEALTH QUESTIONNAIRE - PHQ9
SUM OF ALL RESPONSES TO PHQ QUESTIONS 1-9: 0
2. FEELING DOWN, DEPRESSED OR HOPELESS: 0
1. LITTLE INTEREST OR PLEASURE IN DOING THINGS: 0
SUM OF ALL RESPONSES TO PHQ9 QUESTIONS 1 & 2: 0

## 2023-01-18 ASSESSMENT — ENCOUNTER SYMPTOMS
RESPIRATORY NEGATIVE: 1
GASTROINTESTINAL NEGATIVE: 1

## 2023-01-18 ASSESSMENT — SOCIAL DETERMINANTS OF HEALTH (SDOH): HOW HARD IS IT FOR YOU TO PAY FOR THE VERY BASICS LIKE FOOD, HOUSING, MEDICAL CARE, AND HEATING?: NOT HARD AT ALL

## 2023-01-18 NOTE — PROGRESS NOTES
Kathy Sylvester (:  1950) is a 74 y.o. male,Established patient, here for evaluation of the following chief complaint(s):  Discuss Medications (Pt stopped most of his medication 1-1.5yrs ago on his own, \"don't want to be a pill husam for the rest of my life\")        Subjective   SUBJECTIVE/OBJECTIVE:  HPI:    Chief Complaint   Patient presents with    Discuss Medications     Pt stopped most of his medication 1-1.5yrs ago on his own, \"don't want to be a pill husam for the rest of my life\"     Pt presents today to review meds. Recently seen by Dr. Kalli Davis, placed on Tegretol which is helping. Weaning gabapentin. BP elevated, pt stopped his amlodipine several months ago as well as several other medications. BP Readings from Last 3 Encounters:   23 (!) 152/94   23 (!) 142/88   22 136/80     Wt Readings from Last 3 Encounters:   23 190 lb 14.4 oz (86.6 kg)   23 191 lb (86.6 kg)   22 182 lb 9.6 oz (82.8 kg)         Patient Active Problem List   Diagnosis    Hyperlipidemia    Hypothyroid    HTN (hypertension)    History of nephrectomy    S/P MVR (mitral valve repair)    Lipoma    Scrotal mass     Past Surgical History:   Procedure Laterality Date    CARDIAC CATHETERIZATION      CARDIOVASCULAR STRESS TEST  2010    no CP induced  by exercise. mild htn response to exercise. no arrhythmias. no EKG chg. no wall motion abnormalities. abnormal MV structure w/MR unchanged.     CHOLECYSTECTOMY      [facility]    CHOLECYSTECTOMY      COLONOSCOPY      DIAGNOSTIC CARDIAC CATH LAB PROCEDURE      DISSECTION GROIN N/A 2017    EXCISION OF LEFT DOTTIE SCROTAL MASS performed by Ruperto Pacheco MD at [facility], COLON, [address]  09    [facility]- left indirect and direct inguinal hernia    HERNIA REPAIR  07    Right inguinal hernia- [facility]     INGUINAL HERNIA REPAIR Left 11/3/2014    left hernia repair w mesh    KIDNEY DONATION  2005    MITRAL VALVE SURGERY  11-9-12    repair    TRANSTHORACIC ECHOCARDIOGRAM  10/24/2006    EF 65%. LV size & systolic funct normal. no regional wall motion abnormalities. normal LV wall thickness. mildly enlarged  lt atrium at 4.2 cm. mild aortic sclerosis, no stenosis. severe prolapse of the posterior mitral leaflet. no  pericardial effusion. normal LV inflow. severe eccentric MR is anteriorly directed. mild TR. SPAP 39 mmHg. TRANSTHORACIC ECHOCARDIOGRAM  03/29/2010    EF 55-65%. LV size & systolic funct normal. no regional wall motion abnormalities. MV moderate focal thickening of the posterior leaflet, involving the leaflet from base to margin. mild chordal involvement. valve morphology consistent w/myxomatous proliferation. holosystolic prolapse of the posterior leaflet. (9 mm). mod MR. mild TR.    TRANSTHORACIC ECHOCARDIOGRAM  02/07/2008    EF 65%. LV size & systolic funct normal. no segmental wall motion abnormalities. mild enlargement of lt atrium. mild AV sclerosis w/o stenosis. severe prolapse of the posterior leaflet of the MV. severe MR w/anteriorly directed jet. mild TR. no pericardial effusion. no thrombus or vegetation. Social History     Tobacco Use    Smoking status: Never    Smokeless tobacco: Never   Vaping Use    Vaping Use: Never used   Substance Use Topics    Alcohol use: Yes     Alcohol/week: 2.0 standard drinks     Types: 2 Cans of beer per week     Comment: 2 ALCOHOLIC BEVERAGES WEEKLY    Drug use: No         Review of Systems   Constitutional: Negative. HENT: Negative. Respiratory: Negative. Cardiovascular: Negative. Gastrointestinal: Negative. Musculoskeletal: Negative. All other systems reviewed and are negative. Objective   Physical Exam  Vitals and nursing note reviewed. Constitutional:       General: He is not in acute distress. Appearance: Normal appearance. He is well-developed. HENT:      Head: Normocephalic and atraumatic. Right Ear: Tympanic membrane normal.      Left Ear: Tympanic membrane normal.   Eyes:      Conjunctiva/sclera: Conjunctivae normal.   Cardiovascular:      Rate and Rhythm: Normal rate and regular rhythm. Heart sounds: Normal heart sounds. No murmur heard. Pulmonary:      Effort: Pulmonary effort is normal.      Breath sounds: Normal breath sounds. No wheezing, rhonchi or rales. Abdominal:      General: There is no distension. Musculoskeletal:      Cervical back: Neck supple. Skin:     General: Skin is warm and dry. Findings: No rash (on exposed surfaces). Neurological:      General: No focal deficit present. Mental Status: He is alert. Psychiatric:         Attention and Perception: Attention normal.         Mood and Affect: Mood normal.         Speech: Speech normal.         Behavior: Behavior normal. Behavior is cooperative. Thought Content: Thought content normal.         Judgment: Judgment normal.             ASSESSMENT/PLAN:  1. Post herpetic neuralgia  2. Pure hypercholesterolemia  -     Lipid Panel w/ Reflex Direct LDL; Future  -     Comprehensive Metabolic Panel; Future  3. Hypothyroidism, unspecified type  -     TSH with Reflex; Future  4. Essential hypertension  -     amLODIPine (NORVASC) 5 MG tablet; Take 1 tablet by mouth daily, Disp-90 tablet, R-3Normal  -     CBC with Auto Differential; Future  5. IFG (impaired fasting glucose)  -     Comprehensive Metabolic Panel; Future  -     Hemoglobin A1C; Future  6. History of nephrectomy  7. S/P MVR (mitral valve repair)  8. Screening for prostate cancer  -     PSA Screening; Future    -  Chronic medical problems stable  -  Continue current medications  -  Follow up with specialists as scheduled  -  Restart Norvasc  -  Check labs, will call with results and recommendations. Of note, stopped several of his maintenance medications several months ago    Return for as needed.              An electronic signature was used to authenticate this note.     --Ann Marie Bolus, DO

## 2023-01-20 ENCOUNTER — TELEPHONE (OUTPATIENT)
Dept: NEUROLOGY | Age: 73
End: 2023-01-20

## 2023-01-20 NOTE — TELEPHONE ENCOUNTER
Yes, we can try to increase the Tegretol to 200 mg twice a day. Use own stock for the next couple of days, let us know how you feel by next week.  Thanks   Juan Mancuso MD

## 2023-01-20 NOTE — TELEPHONE ENCOUNTER
Patient called stating he is taking Tegretol 100 mg twice a day and Gabapentin 400 mg three times a day for the left sided face pain. Patient is experiencing headache, same headache he has been experiencing for years, worse since decrease in Gabapentin. Headache location is behind left eye. He is asking if the Tegretol can be increased to help. Office also received CT head report from hospital in [place]. Patient did not have [place] in the past. Patient is claustrophobic, but willing to try MRI. Please advise. Thank you.

## 2023-01-24 NOTE — PROGRESS NOTES
Chief Complaint   Patient presents with    Consultation     NP consult herpetic neuralgia, headache         Mayur Tineo is a 74 y.o. male who presents today for evaluation of left face pain for the past 3 years. Location of his pain involves his left V1 and V2. He describes his symptoms as burning, and can be sharp. He was seen by his PCP at the onset of his symptoms and was told he had shingles. He reports if wind or water hits him this can trigger his pain. He is able to eat and drink without issue, but chewing food can trigger pain. He can also struggle with brushing his teeth. No change in vision. He denies any history cold sores. He was started on Neurontin and Celebrex which has helped some, but he does feel there is room for improvement. He denies chest pain. No shortness of breath, no neck pain. No vision changes. No dysphagia. No fever. No rash. No weight loss. History provided by patient accompanied by his significant other. Past Medical History:   Diagnosis Date    CKD (chronic kidney disease) stage 3, GFR 30-59 ml/min ([facility])     only one kidney     Hyperlipidemia     Hypertension     Mitral valve regurgitation     MOD-SVERE s/p valvuloplasty    Other sleep disturbances     Thyroid disease        Patient Active Problem List   Diagnosis    Hyperlipidemia    Hypothyroid    HTN (hypertension)    History of nephrectomy    S/P MVR (mitral valve repair)    Lipoma    Scrotal mass       Allergies   Allergen Reactions    Tape [Adhesive Tape] Dermatitis     Plastic tape       Current Outpatient Medications   Medication Sig Dispense Refill    celecoxib (CELEBREX) 200 MG capsule TAKE 1 CAPSULE BY MOUTH ONCE DAILY FOR 90 DAYS      gabapentin (NEURONTIN) 800 MG tablet Take 1 tablet by mouth 3 times daily for 7 days.  21 tablet 0    aspirin EC 81 MG EC tablet Take 1 tablet by mouth daily 90 tablet 3    nortriptyline (PAMELOR) 50 MG capsule TAKE 1 CAPSULE NIGHTLY (Patient not taking: Reported on 1/11/2023) 90 capsule 3    fenofibrate (TRICOR) 145 MG tablet TAKE 1 TABLET DAILY (Patient not taking: Reported on 1/11/2023) 90 tablet 3    pantoprazole (PROTONIX) 40 MG tablet TAKE 1 TABLET TWICE A DAY BEFORE MEALS (Patient not taking: Reported on 1/11/2023) 180 tablet 3    SYNTHROID 112 MCG tablet Take 1 tablet by mouth daily Take with water on an empty stomach- wait 30 minutes before eating or taking other meds. (Patient not taking: Reported on 1/11/2023) 30 tablet 2    sildenafil (VIAGRA) 100 MG tablet Take 1 tablet by mouth as needed for Erectile Dysfunction (Patient not taking: Reported on 1/11/2023) 10 tablet 5    atorvastatin (LIPITOR) 10 MG tablet TAKE 1 TABLET DAILY (Patient not taking: Reported on 1/11/2023) 90 tablet 3    amLODIPine (NORVASC) 2.5 MG tablet TAKE 1 TABLET DAILY (Patient not taking: Reported on 1/11/2023) 90 tablet 3    SYNTHROID 100 MCG tablet TAKE 1 TABLET DAILY (Patient not taking: Reported on 1/11/2023) 10 tablet 0     No current facility-administered medications for this visit. Social History     Socioeconomic History    Marital status:      Spouse name: Susy Patel     Number of children: 0    Years of education: 12    Highest education level: None   Occupational History     Employer: RETIRED   Tobacco Use    Smoking status: Never    Smokeless tobacco: Never   Vaping Use    Vaping Use: Never used   Substance and Sexual Activity    Alcohol use: Yes     Alcohol/week: 2.0 standard drinks     Types: 2 Cans of beer per week     Comment: 2 ALCOHOLIC BEVERAGES WEEKLY    Drug use: No    Sexual activity: Not Currently       Family History   Problem Relation Age of Onset    Diabetes Father     Heart Disease Father     Stroke Father     Mental Illness Mother         alzheimiers         I reviewed the past medical history, allergies, medications, social history and family history.    Review of Systems   All systems reviewed, and are all negative, except what is mentioned in HPI      Vitals: 01/11/23 0816   BP: (!) 142/88   Site: Right Upper Arm   Position: Sitting   Cuff Size: Medium Adult   Pulse: 71   SpO2: 95%   Weight: 191 lb (86.6 kg)   Height: 5' 10\" (1.778 m)       Physical Examination:  General appearance - alert, well appearing, and in no distress, oriented to person, place, and time and over weight  Mental status- Level of Alertness: awake  Orientation: person, place, time  Memory: normal  Fund of Knowledge: normal  Attention/Concentration: normal  Language: normal. Mood is normal.   Neck - supple, no significant adenopathy, carotids upstroke normal bilaterally. There is no axillary lymphadenopathy. There is no carotid bruit. No neck lymphadenopathy. No thyroid enlargement   Neurological -   Cranial Dwupbu-ST-PFW:.   Cranial nerve II: Normal   Cranial nerve III: Pupils: equal, round, reactive to light  Cranial nerves III, IV, VI: Extraocular Movements: intact   Cranial nerve V: Facial sensation: intact   Cranial nerve VII:Facial strength: intact   Cranial nerve VIII: Hearing: intact   Cranial nerve IX: Palate Elevation intact bilaterally  Cranial nerve XI: Shoulder shrug intact bilaterally  Cranial nerve XII: Tongue midline   neck supple without rigidity, there is no limitation of range of motion of the neck. DTR's are  decreased distal and symmetric  Babinski sign negative   Motor exam is 5/5 in the upper and lower extremities. Normal muscle tone. There is no muscle atrophy. Sensory is intact for light touch  Coordination: finger to nose, intact  Gait and station intact  Abnormal movement none, vibration normal, proprioception normal  Skin - warm, dry to touch, normal coloration, no rashes, no suspicious skin lesions  Superficial temporal artery pulses are normal.   There is no limitation of range of motion of the neck. There is no resting tremor, no pin rolling, no bradykinesia, no Hypohonia, normal blink rate.   Musculoskeletal: Has no hand arthritis, no limitation of ROM in any of the four extremities. There is no leg edema. The Heart was regular in rate and rhythm. No heart murmur  Chest Clear, with  good effort. Abdomen soft, intact bowel sounds. We reviewed the patient records from referring provider and available information in the EHR       ASSESSMENT:      Diagnosis Orders   1. Left-sided face pain           This is a 66-year-old male who presents with left face pain that has been ongoing for the past 3 years that is progressively gotten worse. Location of his pain involves left V1 and V2. He describes his pain as sharp and burning. He is currently on gabapentin and reports this does help, however he feels there is more room for improvement. The patient was counseled about his symptoms and work up recommended, he was also counseled about medication options and side effects. I shared with the patient his symptoms are likely related to the left trigeminal neuralgia. We will attempt to obtain previous MRI brain report and images from [facility] in [place]. We will start him on Tegretol 100 mg 2 times a day to be further increased based on patient feedback and response and asked him to reduce his gabapentin to 400 mg 3 times a day. We will also obtain lab work checking viral titers. After detailed discussion with the patient and his significant other we agreed on the following plan. Plan    Obtain MRI brain report and images from [facility] Tegretol 100 mg two times a day  Change Gabapentin to 400 mg three times a day  VZV, HSV, EBV titers  Call with any new symptoms or concerns. Follow up in 1 month.      Total time 47 min    Lauryn Jarquin MD

## 2023-01-27 DIAGNOSIS — R51.9 LEFT-SIDED FACE PAIN: Primary | ICD-10-CM

## 2023-01-27 DIAGNOSIS — G50.0 LEFT-SIDED TRIGEMINAL NEURALGIA: ICD-10-CM

## 2023-01-27 RX ORDER — CARBAMAZEPINE 100 MG/1
200 TABLET, CHEWABLE ORAL 3 TIMES DAILY
Qty: 180 TABLET | Refills: 0 | Status: SHIPPED | OUTPATIENT
Start: 2023-01-27

## 2023-01-27 NOTE — TELEPHONE ENCOUNTER
Patient notified and verbalized understanding. Lab order mailed to address on profile. Patient requesting script be sent to pharmacy.

## 2023-01-27 NOTE — TELEPHONE ENCOUNTER
Take Tegretol 200 mg three times a day. Update us in one week on how he is doing. Also obtain Tegretol level in one week, to make sure its not too much for him.    Emma Ring MD

## 2023-01-27 NOTE — TELEPHONE ENCOUNTER
Patient called stating he is taking Tegretol 200 mg twice a day for left sided face pain. He fells the last increase is helping, but there is room for improvement as he continues to have face pain. He is asking if the Tegretol can be increased. Please advise. Thank you.

## 2023-01-30 ENCOUNTER — HOSPITAL ENCOUNTER (OUTPATIENT)
Dept: CT IMAGING | Age: 73
Discharge: HOME | End: 2023-01-30

## 2023-01-30 ENCOUNTER — HOSPITAL ENCOUNTER (OUTPATIENT)
Dept: GENERAL RADIOLOGY | Age: 73
Discharge: HOME | End: 2023-01-30

## 2023-01-30 DIAGNOSIS — Z00.6 ENCOUNTER FOR EXAMINATION FOR NORMAL COMPARISON AND CONTROL IN CLINICAL RESEARCH PROGRAM: ICD-10-CM

## 2023-02-02 ENCOUNTER — NURSE ONLY (OUTPATIENT)
Dept: LAB | Age: 73
End: 2023-02-02

## 2023-02-02 DIAGNOSIS — G50.0 LEFT-SIDED TRIGEMINAL NEURALGIA: ICD-10-CM

## 2023-02-02 DIAGNOSIS — R51.9 LEFT-SIDED FACE PAIN: ICD-10-CM

## 2023-02-02 LAB — CARBAMAZEPINE SERPL-MCNC: 9.8 MCG/ML (ref 2–10)

## 2023-02-10 ENCOUNTER — OFFICE VISIT (OUTPATIENT)
Dept: NEUROLOGY | Age: 73
End: 2023-02-10

## 2023-02-10 VITALS
WEIGHT: 192 LBS | HEIGHT: 70 IN | HEART RATE: 88 BPM | BODY MASS INDEX: 27.49 KG/M2 | DIASTOLIC BLOOD PRESSURE: 98 MMHG | SYSTOLIC BLOOD PRESSURE: 140 MMHG | OXYGEN SATURATION: 94 %

## 2023-02-10 DIAGNOSIS — R51.9 LEFT-SIDED FACE PAIN: Primary | ICD-10-CM

## 2023-02-10 DIAGNOSIS — G50.0 LEFT-SIDED TRIGEMINAL NEURALGIA: ICD-10-CM

## 2023-02-10 RX ORDER — GABAPENTIN 300 MG/1
300 CAPSULE ORAL 2 TIMES DAILY
Qty: 60 CAPSULE | Refills: 3 | Status: SHIPPED | OUTPATIENT
Start: 2023-02-10 | End: 2023-04-11

## 2023-02-10 RX ORDER — LORAZEPAM 0.5 MG/1
0.5 TABLET ORAL
Qty: 1 TABLET | Refills: 0 | Status: SHIPPED | OUTPATIENT
Start: 2023-02-10 | End: 2023-02-10

## 2023-02-10 NOTE — PROGRESS NOTES
[facility] NEUROLOGY OUT PATIENT FOLLOW UP NOTE:  2/10/77479:55 AM    Danica Rucker is here for follow up for   Patient Active Problem List   Diagnosis    Hyperlipidemia    Hypothyroid    HTN (hypertension)    History of nephrectomy    S/P MVR (mitral valve repair)    Lipoma    Scrotal mass    Follow up for left trigeminal neuralgia. He is here to go over plan. Allergies   Allergen Reactions    Tape Caitlyn Dessert Tape] Dermatitis     Plastic tape       Current Outpatient Medications:     carBAMazepine (TEGRETOL) 100 MG chewable tablet, Take 2 tablets by mouth 3 times daily, Disp: 180 tablet, Rfl: 0    amLODIPine (NORVASC) 5 MG tablet, Take 1 tablet by mouth daily, Disp: 90 tablet, Rfl: 3    aspirin EC 81 MG EC tablet, Take 1 tablet by mouth daily, Disp: 90 tablet, Rfl: 3    celecoxib (CELEBREX) 200 MG capsule, TAKE 1 CAPSULE BY MOUTH ONCE DAILY FOR 90 DAYS (Patient not taking: Reported on 2/10/2023), Disp: , Rfl:     gabapentin (NEURONTIN) 400 MG capsule, Take 1 capsule by mouth 3 times daily for 90 days. (Patient not taking: Reported on 2/10/2023), Disp: 90 capsule, Rfl: 2    I reviewed the past medical history, allergies, medications, social history and family history. PE:   Vitals:    02/10/23 0838   BP: (!) 140/98   Site: Right Upper Arm   Position: Sitting   Cuff Size: Medium Adult   Pulse: 88   SpO2: 94%   Weight: 192 lb (87.1 kg)   Height: 5' 10\" (1.778 m)     General Appearance:  alert and cooperative, he has tenderness non pulsatile, area in the left temple  Gen: NAD, Language is Intact. Skin: no rash, lesion,  moist to touch. warm  Head: no rash, no icterus, superficial temporal artery pulses are symmetric intact. Neck: The Neck is supple. There is no neck lymphadenopathy. Neuro: CN 2-12 grossly intact with no focal deficits. Power 5/5 Throughout symmetric, Reflexes are symmetric. Long tracts are intact. Cerebellar exam is Intact. Sensory exam is intact to light touch.   Gait is intact. Musculoskeletal:  Has no hand arthritis, no limitation of ROM in any of the four extremities. Lower extremities no edema          DATA:      Results for orders placed or performed in visit on 02/02/23   Carbamazepine Level, Total   Result Value Ref Range    Carbamazepine, Total 9.8 2.0 - 10.0 mcg/ml                 Assessment:     Diagnosis Orders   1. Left-sided face pain        2. Left-sided trigeminal neuralgia             Follow up for left trigeminal neuralgia. He reports the symptoms are controlled up to hours prior to the next dosage and an hour after. He is on Tegretol 200 mg three times a day. His most recent level was 9.8. He reports some jaw pain with chewing. No vision changes. No food taste change. He is interested in medication. No new symptoms. He is sleepy more than before. We have not been able to obtain MRI brain images from [place]. He has left face pain, and trouble with pain intensifying while chewing. He is shielding the face from the temp extremes. His Tegretol is making him feel sleepy, and lacks energy on further discussion. The patient was counseled about his symptoms and work up recommended, he was also counseled about medication options. We also discussed that due to concern about side effects, we cannot increase the Tegretol further, level is 9.8. He will need to undergo MRI brain and CTA brain to evaluate for cause, of left intractable trigeminal neuralgia. He is claustrophobic, requested medication help him thru the MRI brain, I ordered Ativan 0.5 mg one table to take one hour prior to MRI, After detailed discussion with patient and his wife we agreed on the following plan. .          Plan:  Change Tegretol to 100 mg in am, 200 mg in the afternoon and 100 mg at night. Start Neurontin 300 mg twice a day. MRI Brain with and without contrast evaluate for left face pain and discomfort. CTA brain with contrast evaluate for left trigeminal neuralgia.    Report any new symptoms. Follow up in 4 weeks or sooner if needed. Call if any questions or concerns.     Total time 34 min    Shantel Proctor MD

## 2023-02-10 NOTE — PATIENT INSTRUCTIONS
Change Tegretol to 100 mg in am, 200 mg in the afternoon and 100 mg at night. Start Neurontin 300 mg twice a day. MRI Brain with and without contrast evaluate for left face pain and discomfort. CTA brain with contrast evaluate for left trigeminal neuralgia. Report any new symptoms. Follow up in 4 weeks or sooner if needed.

## 2023-02-21 ENCOUNTER — HOSPITAL ENCOUNTER (OUTPATIENT)
Dept: MRI IMAGING | Age: 73
Discharge: HOME | End: 2023-02-21
Payer: MEDICARE

## 2023-02-21 ENCOUNTER — HOSPITAL ENCOUNTER (OUTPATIENT)
Dept: CT IMAGING | Age: 73
Discharge: HOME | End: 2023-02-21
Payer: MEDICARE

## 2023-02-21 DIAGNOSIS — R51.9 LEFT-SIDED FACE PAIN: ICD-10-CM

## 2023-02-21 DIAGNOSIS — G50.0 LEFT-SIDED TRIGEMINAL NEURALGIA: ICD-10-CM

## 2023-02-21 LAB
CREAT BLD-MCNC: 1.2 MG/DL (ref 0.5–1.2)
GFR SERPL CREATININE-BSD FRML MDRD: > 60 ML/MIN/1.73M2

## 2023-02-21 PROCEDURE — 6360000004 HC RX CONTRAST MEDICATION: Performed by: PSYCHIATRY & NEUROLOGY

## 2023-02-21 PROCEDURE — A9579 GAD-BASE MR CONTRAST NOS,1ML: HCPCS | Performed by: PSYCHIATRY & NEUROLOGY

## 2023-02-21 PROCEDURE — 70496 CT ANGIOGRAPHY HEAD: CPT

## 2023-02-21 PROCEDURE — 70553 MRI BRAIN STEM W/O & W/DYE: CPT

## 2023-02-21 RX ADMIN — IOPAMIDOL 100 ML: 755 INJECTION, SOLUTION INTRAVENOUS at 10:44

## 2023-02-21 RX ADMIN — GADOTERIDOL 20 ML: 279.3 INJECTION, SOLUTION INTRAVENOUS at 10:01

## 2023-03-03 DIAGNOSIS — R51.9 LEFT-SIDED FACE PAIN: ICD-10-CM

## 2023-03-03 DIAGNOSIS — G50.0 LEFT-SIDED TRIGEMINAL NEURALGIA: ICD-10-CM

## 2023-03-03 RX ORDER — CARBAMAZEPINE 100 MG/1
200 TABLET, CHEWABLE ORAL 3 TIMES DAILY
Qty: 180 TABLET | Refills: 0 | Status: SHIPPED | OUTPATIENT
Start: 2023-03-03

## 2023-03-03 NOTE — TELEPHONE ENCOUNTER
Patient is currently in [place] and is about to run out of medication. Coming home in the next week. Requesting a refill be sent to a pharmacy in [place]. Prescription pended. Please advise. Thank you.

## 2023-03-10 ENCOUNTER — OFFICE VISIT (OUTPATIENT)
Dept: NEUROLOGY | Age: 73
End: 2023-03-10
Payer: MEDICARE

## 2023-03-10 VITALS
HEART RATE: 73 BPM | SYSTOLIC BLOOD PRESSURE: 148 MMHG | OXYGEN SATURATION: 96 % | HEIGHT: 70 IN | WEIGHT: 187 LBS | DIASTOLIC BLOOD PRESSURE: 90 MMHG | BODY MASS INDEX: 26.77 KG/M2

## 2023-03-10 DIAGNOSIS — R51.9 LEFT-SIDED FACE PAIN: Primary | ICD-10-CM

## 2023-03-10 DIAGNOSIS — G50.0 LEFT-SIDED TRIGEMINAL NEURALGIA: ICD-10-CM

## 2023-03-10 PROCEDURE — 3078F DIAST BP <80 MM HG: CPT | Performed by: NURSE PRACTITIONER

## 2023-03-10 PROCEDURE — 99213 OFFICE O/P EST LOW 20 MIN: CPT | Performed by: NURSE PRACTITIONER

## 2023-03-10 PROCEDURE — 3074F SYST BP LT 130 MM HG: CPT | Performed by: NURSE PRACTITIONER

## 2023-03-10 PROCEDURE — 1123F ACP DISCUSS/DSCN MKR DOCD: CPT | Performed by: NURSE PRACTITIONER

## 2023-03-10 RX ORDER — GABAPENTIN 300 MG/1
300 CAPSULE ORAL 2 TIMES DAILY
Qty: 60 CAPSULE | Refills: 3 | Status: SHIPPED | OUTPATIENT
Start: 2023-03-10 | End: 2023-05-09

## 2023-03-10 NOTE — PROGRESS NOTES
NEUROLOGY OUT PATIENT FOLLOW UP NOTE:  3/10/61927:12 AM    Agatha Maki is here for follow up for left sided face pain. Allergies   Allergen Reactions    Tape Hazel Mayi Tape] Dermatitis     Plastic tape       Current Outpatient Medications:     carBAMazepine (TEGRETOL) 100 MG chewable tablet, Take 2 tablets by mouth 3 times daily, Disp: 180 tablet, Rfl: 0    gabapentin (NEURONTIN) 300 MG capsule, Take 1 capsule by mouth 2 times daily for 60 days. , Disp: 60 capsule, Rfl: 3    amLODIPine (NORVASC) 5 MG tablet, Take 1 tablet by mouth daily, Disp: 90 tablet, Rfl: 3    aspirin EC 81 MG EC tablet, Take 1 tablet by mouth daily, Disp: 90 tablet, Rfl: 3    celecoxib (CELEBREX) 200 MG capsule, TAKE 1 CAPSULE BY MOUTH ONCE DAILY FOR 90 DAYS (Patient not taking: No sig reported), Disp: , Rfl:     I reviewed the past medical history, allergies, medications, social history and family history. PE:   Vitals:    03/10/23 0808   BP: (!) 148/90   Site: Left Upper Arm   Position: Sitting   Cuff Size: Medium Adult   Pulse: 73   SpO2: 96%   Weight: 187 lb (84.8 kg)   Height: 5' 10\" (1.778 m)     General Appearance:  awake, alert, oriented,  Gen: NAD, Language is Intact. Skin: no rash, lesion, dry  to touch. warm  Head: no rash, no icterus  Neck: There is no carotid bruits. The Neck is supple. Neuro: CN 2-12 grossly intact with no focal deficits. Power 5/5 Throughout symmetric, Reflexes are  symmetric. Long tracts are intact. Cerebellar exam is Intact. Sensory exam is intact to light touch. Gait is intact. Musculoskeletal:  Has no hand arthritis, no limitation of ROM in any of the four extremities.   Lower extremities no edema          DATA:         Results for orders placed or performed during the hospital encounter of 02/21/23   Creatinine, Whole Blood   Result Value Ref Range    CREATININE, WHOLE BLOOD 1.2 0.5 - 1.2 mg/dl   Glomerular Filtration Rate, Estimated   Result Value Ref Range    Estimated GFR, PCACC > 60 >60 ml/min/1.73m2              Results for orders placed during the hospital encounter of 02/21/23    CTA HEAD W WO CONTRAST    Narrative  PROCEDURE: CTA HEAD W WO CONTRAST    CLINICAL INFORMATION: Left-sided face pain, Left-sided trigeminal neuralgia. COMPARISON: MRI scan of the brain obtained on the same day. .    TECHNIQUE: 1 mm CT axial images were obtained through the head after the fast bolus administration of contrast. A noncontrast localizer was obtained. 3-D reconstructions were performed on a dedicated 3-D workstation. These include multiplanar MPR images  and multiplanar MIP images. Isovue intravenous contrast was given. All CT scans at this facility use dose modulation, iterative reconstruction, and/or weight-based dosing when appropriate to reduce radiation dose to as low as reasonably achievable. FINDINGS:      Internal carotid arteries: There is atherosclerotic calcification in the cavernous segments of both internal carotid arteries. There is no evidence of severe stenosis. .    Middle cerebral arteries: There is antegrade flow in the middle cerebral arteries bilaterally. Lily Quiver Anterior cerebral arteries: There is antegrade flow in the anterior cerebral arteries bilaterally. Vertebral arteries: There are punctate calcifications in both distal vertebral arteries. There is antegrade flow in the vertebral arteries bilaterally    Basilar artery: There is antegrade flow in the basilar artery. Superior cerebellar arteries: There is antegrade flow in the right and left superior cerebellar arteries. Posterior cerebral arteries: There is antegrade flow in the posterior cerebral arteries bilaterally. No aneurysms, stenoses or occlusions are noted. The superior sagittal sinus, vein of Trent, internal cerebral veins, straight sinus, transverse sinuses and sigmoid sinuses are patent. There are no suspicious findings on the axial source data. Impression  1.  There is atherosclerotic calcification in the cavernous segments of both internal carotid arteries. There is no evidence of severe stenosis. 2. There are punctate calcifications in both distal vertebral arteries. There is antegrade flow in the vertebral arteries bilaterally. 3. Otherwise negative CTA of the brain. .      **This report has been created using voice recognition software. It may contain minor errors which are inherent in voice recognition technology. **    Final report electronically signed by DR Nicko Tobin on 2/21/2023 1:47 PM    No results found for this or any previous visit. No results found for this or any previous visit. Results for orders placed during the hospital encounter of 02/21/23    MRI BRAIN W WO CONTRAST    Narrative  PROCEDURE: MRI BRAIN W [email or web address] INFORMATIONLeft-sided face pain, Left-sided trigeminal neuralgia. COMPARISON: CT scan of the brain dated 10/20/2022. .    TECHNIQUE: Multiplanar and multiple spin echo T1 and T2-weighted images were obtained through the brain before and after the administration of intravenous contrast.    FINDINGS:      The diffusion-weighted images are normal. The brain volume is reduced . There are no intra-or extra-axial collections. There is no hydrocephalus, midline shift or mass effect. On the FLAIR and T2-weighted sequences, there is increased signal intensity in the white matter most likely representing ischemic changes. There is no evidence for an acute infarct. The 5th nerves are normal bilaterally. There is no evidence of a mass. On the gradient echo T2-weighted images, there are areas of susceptibility artifact in the right cerebellar hemisphere and left frontal low    There is no abnormal enhancement in the brain. The major intracranial vascular flow voids are present. The midline craniocervical junction structures are normal.  The brainstem and pituitary gland are normal.    Impression  1. The 5th nerves are normal bilaterally.   2. There is mild atrophy. 3. There are probable ischemic changes in the white matter. There is no evidence for an acute infarct. 4. There are areas of susceptibility artifact in the right cerebellar hemisphere and left frontal lobe. **This report has been created using voice recognition software. It may contain minor errors which are inherent in voice recognition technology. **      Final report electronically signed by DR Sammy Rivas on 2/21/2023 1:41 PM    No results found for this or any previous visit. Results for orders placed in visit on 01/12/23    CT HEAD WO CONTRAST         Assessment:     Diagnosis Orders   1. Left-sided face pain        2. Left-sided trigeminal neuralgia             Follow up for left trigeminal neuralgia. Ran Collette He is doing the same. He is still having left face pain. He is on tegretol and gabapentin. He does not feel his left sided face pain is well controlled. I shared with the patient that his MRI brain and CTA head W/WO contrast showed no concerning findings. He reports some jaw pain with chewing. No vision changes. No food taste change. His Tegretol is making him feel sleepy, and lacks energy on further discussion. We will arrange for him to undergo formal evalaution with neurosurgery re: left sided face ain, left trigeminal neuralgia. After detailed discussion with patient and his wife we agreed on the following plan. Plan:  Referral to neurosurgery at [address] [place] [address] re: left sided facial pain, left sided trigeminal neuralgia  Continue with Tegretol to 100 mg in am, 200 mg in the afternoon and 100 mg at night. Continue with Neurontin 300 mg twice a day. Follow up after evaluation with [facility] neurosurgery. Call if any questions or concerns.     Total time 26 min    FRANCESCO Muro - CNP

## 2023-03-10 NOTE — PATIENT INSTRUCTIONS
Referral to neurosurgery at [facility] re: left sided facial pain, left sided trigeminal neuralgia  Continue with Tegretol to 100 mg in am, 200 mg in the afternoon and 100 mg at night. Continue with Neurontin 300 mg twice a day. Follow up after evaluation with [facility] neurosurgery. Call if any questions or concerns.

## 2023-03-30 ENCOUNTER — TELEPHONE (OUTPATIENT)
Dept: FAMILY MEDICINE CLINIC | Age: 73
End: 2023-03-30

## 2023-03-30 DIAGNOSIS — R51.9 LEFT-SIDED FACE PAIN: ICD-10-CM

## 2023-03-30 DIAGNOSIS — I10 ESSENTIAL HYPERTENSION: ICD-10-CM

## 2023-03-30 DIAGNOSIS — G50.0 LEFT-SIDED TRIGEMINAL NEURALGIA: ICD-10-CM

## 2023-03-30 RX ORDER — CARBAMAZEPINE 100 MG/1
TABLET, CHEWABLE ORAL
Qty: 120 TABLET | Refills: 1 | Status: SHIPPED | OUTPATIENT
Start: 2023-03-30

## 2023-03-30 RX ORDER — AMLODIPINE BESYLATE 5 MG/1
5 TABLET ORAL DAILY
Qty: 90 TABLET | Refills: 3 | Status: SHIPPED | OUTPATIENT
Start: 2023-03-30

## 2023-03-30 NOTE — TELEPHONE ENCOUNTER
Patient called requesting refill on Tegretol. Dose verified. Patient stated he was seen by [facility] neurosurgery, note in care everywhere, and was instructed to start Lamictal titration and continue Gabapentin and Tegretol. Patient stated he is returning to [facility] 6/7/23 to discuss weaning off Tegretol. Patient is requesting refill until he can be weaned off.

## 2023-12-09 ENCOUNTER — APPOINTMENT (OUTPATIENT)
Dept: GENERAL RADIOLOGY | Age: 73
End: 2023-12-09
Payer: MEDICARE

## 2023-12-09 ENCOUNTER — HOSPITAL ENCOUNTER (EMERGENCY)
Age: 73
Discharge: HOME | End: 2023-12-09
Payer: MEDICARE

## 2023-12-09 VITALS
DIASTOLIC BLOOD PRESSURE: 68 MMHG | HEART RATE: 103 BPM | BODY MASS INDEX: 25.02 KG/M2 | RESPIRATION RATE: 16 BRPM | TEMPERATURE: 98.1 F | OXYGEN SATURATION: 92 % | SYSTOLIC BLOOD PRESSURE: 117 MMHG | WEIGHT: 174.4 LBS

## 2023-12-09 DIAGNOSIS — J18.9 PNEUMONIA OF BOTH LOWER LOBES DUE TO INFECTIOUS ORGANISM: Primary | ICD-10-CM

## 2023-12-09 LAB — SARS-COV-2 RDRP RESP QL NAA+PROBE: NOT  DETECTED

## 2023-12-09 PROCEDURE — 99213 OFFICE O/P EST LOW 20 MIN: CPT

## 2023-12-09 PROCEDURE — 71046 X-RAY EXAM CHEST 2 VIEWS: CPT

## 2023-12-09 PROCEDURE — 99214 OFFICE O/P EST MOD 30 MIN: CPT

## 2023-12-09 PROCEDURE — 87635 SARS-COV-2 COVID-19 AMP PRB: CPT

## 2023-12-09 RX ORDER — LAMOTRIGINE 25 MG/1
TABLET ORAL
COMMUNITY

## 2023-12-09 RX ORDER — PREDNISONE 20 MG/1
20 TABLET ORAL 2 TIMES DAILY
Qty: 10 TABLET | Refills: 0 | Status: SHIPPED | OUTPATIENT
Start: 2023-12-09 | End: 2023-12-14

## 2023-12-09 RX ORDER — GUAIFENESIN 600 MG/1
600 TABLET, EXTENDED RELEASE ORAL 2 TIMES DAILY
Qty: 30 TABLET | Refills: 0 | Status: SHIPPED | OUTPATIENT
Start: 2023-12-09 | End: 2023-12-24

## 2023-12-09 RX ORDER — AZITHROMYCIN 250 MG/1
250 TABLET, FILM COATED ORAL SEE ADMIN INSTRUCTIONS
Qty: 6 TABLET | Refills: 0 | Status: SHIPPED | OUTPATIENT
Start: 2023-12-09 | End: 2023-12-14

## 2023-12-09 RX ORDER — AMOXICILLIN AND CLAVULANATE POTASSIUM 875; 125 MG/1; MG/1
1 TABLET, FILM COATED ORAL 2 TIMES DAILY
Qty: 20 TABLET | Refills: 0 | Status: SHIPPED | OUTPATIENT
Start: 2023-12-09 | End: 2023-12-19

## 2023-12-09 RX ORDER — LACTOBACILLUS RHAMNOSUS GG 10B CELL
1 CAPSULE ORAL DAILY
Qty: 15 CAPSULE | Refills: 0 | Status: SHIPPED | OUTPATIENT
Start: 2023-12-09

## 2023-12-09 ASSESSMENT — ENCOUNTER SYMPTOMS
SHORTNESS OF BREATH: 0
SORE THROAT: 0
SINUS PRESSURE: 0
SINUS PAIN: 0
VOMITING: 0
COUGH: 1
DIARRHEA: 0
ABDOMINAL PAIN: 0
WHEEZING: 0
NAUSEA: 0

## 2023-12-09 ASSESSMENT — PAIN - FUNCTIONAL ASSESSMENT: PAIN_FUNCTIONAL_ASSESSMENT: NONE - DENIES PAIN

## 2023-12-09 NOTE — ED TRIAGE NOTES
[address] arrives to room with complaint of  cough, fever, no appetite, diarrhea  symptoms started 2 days ago.

## 2023-12-10 NOTE — DISCHARGE INSTRUCTIONS
You have bilateral lower lobe pneumonia. Take medications as prescribed. If you develop high fevers, chest pain, or become short of breath, go to the nearest Emergency Room for reevaluation and reassessment.

## 2023-12-10 NOTE — ED PROVIDER NOTES
[address]  Urgent Care Encounter      CHIEF COMPLAINT       Chief Complaint   Patient presents with    Cough       Nurses Notes reviewed and I agree except as noted in the HPI. HISTORY OF PRESENT ILLNESS   Lawanda Nelson is a 74 y.o. male who presents to urgent care with complaints of cough. Patient reports his symptoms have been ongoing for 2 days. Patient reports he recently traveled to [place] for a  and when he returned he noticed the cough and intermittent fevers. Patient reports he has been taking DayQuil and NyQuil with relief, although reports today he just feels \"rundown. \"  Patient denies chest pain, shortness of breath, nausea or vomiting. Patient does report some diarrhea. Patient denies being around anyone sick recently that he is aware of. REVIEW OF SYSTEMS     Review of Systems   Constitutional:  Positive for fatigue and fever. Negative for chills. HENT:  Positive for congestion. Negative for ear discharge, ear pain, sinus pressure, sinus pain and sore throat. Respiratory:  Positive for cough. Negative for shortness of breath and wheezing. Cardiovascular:  Negative for chest pain. Gastrointestinal:  Negative for abdominal pain, diarrhea, nausea and vomiting. Genitourinary:  Negative for difficulty urinating, dysuria and enuresis. Musculoskeletal:  Negative for arthralgias. Neurological:  Negative for dizziness, seizures, light-headedness, numbness and headaches.        PAST MEDICAL HISTORY         Diagnosis Date    CKD (chronic kidney disease) stage 3, GFR 30-59 ml/min ([facility])     only one kidney     Hyperlipidemia     Hypertension     Mitral valve regurgitation     MOD-SVERE s/p valvuloplasty    Other sleep disturbances     Thyroid disease        SURGICAL HISTORY     Patient  has a past surgical history that includes Kidney donation (); transthoracic echocardiogram (10/24/2006); cardiovascular stress test (2010); transthoracic

## 2024-08-01 ENCOUNTER — APPOINTMENT (OUTPATIENT)
Dept: GENERAL RADIOLOGY | Age: 74
End: 2024-08-01
Payer: MEDICARE

## 2024-08-01 ENCOUNTER — HOSPITAL ENCOUNTER (EMERGENCY)
Age: 74
Discharge: HOME | End: 2024-08-01
Payer: MEDICARE

## 2024-08-01 VITALS
DIASTOLIC BLOOD PRESSURE: 101 MMHG | TEMPERATURE: 98.2 F | SYSTOLIC BLOOD PRESSURE: 150 MMHG | RESPIRATION RATE: 16 BRPM | HEART RATE: 88 BPM | HEIGHT: 70 IN | WEIGHT: 175 LBS | BODY MASS INDEX: 25.05 KG/M2 | OXYGEN SATURATION: 95 %

## 2024-08-01 DIAGNOSIS — M79.672 LEFT FOOT PAIN: Primary | ICD-10-CM

## 2024-08-01 DIAGNOSIS — Z87.39 HISTORY OF GOUT: ICD-10-CM

## 2024-08-01 PROCEDURE — 73630 X-RAY EXAM OF FOOT: CPT

## 2024-08-01 PROCEDURE — 99283 EMERGENCY DEPT VISIT LOW MDM: CPT

## 2024-08-01 RX ORDER — METHYLPREDNISOLONE 4 MG/1
TABLET ORAL
Qty: 1 KIT | Refills: 0 | Status: SHIPPED | OUTPATIENT
Start: 2024-08-01 | End: 2024-08-07

## 2024-08-01 ASSESSMENT — PAIN SCALES - GENERAL: PAINLEVEL_OUTOF10: 8

## 2024-08-01 ASSESSMENT — PAIN DESCRIPTION - LOCATION: LOCATION: FOOT

## 2024-08-01 ASSESSMENT — PAIN - FUNCTIONAL ASSESSMENT: PAIN_FUNCTIONAL_ASSESSMENT: 0-10

## 2024-08-01 NOTE — ED TRIAGE NOTES
Patient presents to ED from home with C/O foot pain. Patient states his left foot is in pain. Patient denies any injury and just wants an xray to see what's going on. VS obtained. Patient BP elevated at this time. Patient states he is supposed to take his BP meds daily but takes them every other day due to \"the pills being too small and make me feel like ill choke.\"

## 2024-08-01 NOTE — DISCHARGE INSTRUCTIONS
This is likely gout vs inflammatory arthritis.  Take steroids as directed.  Lidocaine or voltaren gel can also help with pain.  Recommend follow up with podiatry.  Return if symptoms worsen.

## 2024-08-05 NOTE — ED PROVIDER NOTES
repair w mesh    KIDNEY DONATION      MITRAL VALVE SURGERY  10    repair    TRANSTHORACIC ECHOCARDIOGRAM  10/24/2006    EF 65%. LV size & systolic funct normal. no regional wall motion abnormalities. normal LV wall thickness. mildly enlarged  lt atrium at 4.2 cm. mild aortic sclerosis, no stenosis. severe prolapse of the posterior mitral leaflet. no  pericardial effusion. normal LV inflow. severe eccentric MR is anteriorly directed. mild TR. SPAP 39 mmHg.    TRANSTHORACIC ECHOCARDIOGRAM  2010    EF 55-65%. LV size & systolic funct normal. no regional wall motion abnormalities. MV moderate focal thickening of the posterior leaflet, involving the leaflet from base to margin. mild chordal involvement.  valve morphology consistent w/myxomatous proliferation. holosystolic prolapse of the posterior leaflet. (9 mm). mod MR. mild TR.    TRANSTHORACIC ECHOCARDIOGRAM  2008    EF 65%. LV size & systolic funct normal. no segmental wall motion abnormalities. mild enlargement of lt atrium. mild AV sclerosis w/o stenosis. severe prolapse of the posterior leaflet of the MV. severe MR w/anteriorly directed jet. mild TR. no pericardial effusion. no thrombus or vegetation.       CURRENT MEDICATIONS       Discharge Medication List as of 2024  6:03 AM        CONTINUE these medications which have NOT CHANGED    Details   lamoTRIgine (LAMICTAL) 25 MG tablet TAKE 3 TABLETS BY MOUTH TWICE DAILYHistorical Med      lactobacillus (CULTURELLE) capsule Take 1 capsule by mouth daily, Disp-15 capsule, R-0Normal      amLODIPine (NORVASC) 5 MG tablet Take 1 tablet by mouth daily, Disp-90 tablet, R-3Normal             ALLERGIES     is allergic to tape [adhesive tape].    FAMILY HISTORY     He indicated that his mother is . He indicated that his father is . He indicated that both of his sisters are alive. He indicated that both of his brothers are alive.       SOCIAL HISTORY       Social History     Tobacco Use

## 2024-08-07 ENCOUNTER — OFFICE VISIT (OUTPATIENT)
Dept: FAMILY MEDICINE CLINIC | Age: 74
End: 2024-08-07

## 2024-08-07 ENCOUNTER — HOSPITAL ENCOUNTER (OUTPATIENT)
Age: 74
Discharge: HOME | End: 2024-08-07
Payer: MEDICARE

## 2024-08-07 VITALS
WEIGHT: 180 LBS | RESPIRATION RATE: 16 BRPM | HEART RATE: 68 BPM | SYSTOLIC BLOOD PRESSURE: 122 MMHG | DIASTOLIC BLOOD PRESSURE: 70 MMHG | BODY MASS INDEX: 25.83 KG/M2

## 2024-08-07 DIAGNOSIS — M10.00 ACUTE IDIOPATHIC GOUT, UNSPECIFIED SITE: ICD-10-CM

## 2024-08-07 DIAGNOSIS — B02.29 POST HERPETIC NEURALGIA: ICD-10-CM

## 2024-08-07 DIAGNOSIS — I10 ESSENTIAL HYPERTENSION: ICD-10-CM

## 2024-08-07 DIAGNOSIS — M10.00 ACUTE IDIOPATHIC GOUT, UNSPECIFIED SITE: Primary | ICD-10-CM

## 2024-08-07 DIAGNOSIS — E78.00 PURE HYPERCHOLESTEROLEMIA: ICD-10-CM

## 2024-08-07 DIAGNOSIS — Z90.5 HISTORY OF NEPHRECTOMY: ICD-10-CM

## 2024-08-07 DIAGNOSIS — Z98.890 S/P MVR (MITRAL VALVE REPAIR): ICD-10-CM

## 2024-08-07 DIAGNOSIS — R73.01 IFG (IMPAIRED FASTING GLUCOSE): ICD-10-CM

## 2024-08-07 LAB
ALBUMIN SERPL BCG-MCNC: 4.5 G/DL (ref 3.5–5.1)
ALP SERPL-CCNC: 155 U/L (ref 38–126)
ALT SERPL W/O P-5'-P-CCNC: 39 U/L (ref 11–66)
ANION GAP SERPL CALC-SCNC: 14 MEQ/L (ref 8–16)
AST SERPL-CCNC: 21 U/L (ref 5–40)
BASOPHILS ABSOLUTE: 0.1 THOU/MM3 (ref 0–0.1)
BASOPHILS NFR BLD AUTO: 0.5 %
BILIRUB SERPL-MCNC: 0.5 MG/DL (ref 0.3–1.2)
BUN SERPL-MCNC: 21 MG/DL (ref 7–22)
CALCIUM SERPL-MCNC: 9.4 MG/DL (ref 8.5–10.5)
CHLORIDE SERPL-SCNC: 98 MEQ/L (ref 98–111)
CHOLEST SERPL-MCNC: 240 MG/DL (ref 100–199)
CO2 SERPL-SCNC: 24 MEQ/L (ref 23–33)
CREAT SERPL-MCNC: 1 MG/DL (ref 0.4–1.2)
DEPRECATED MEAN GLUCOSE BLD GHB EST-ACNC: 120 MG/DL (ref 70–126)
EOSINOPHIL NFR BLD AUTO: 1.6 %
EOSINOPHILS ABSOLUTE: 0.2 THOU/MM3 (ref 0–0.4)
ERYTHROCYTE [DISTWIDTH] IN BLOOD BY AUTOMATED COUNT: 13.5 % (ref 11.5–14.5)
GFR SERPL CREATININE-BSD FRML MDRD: 78 ML/MIN/1.73M2
GLUCOSE SERPL-MCNC: 114 MG/DL (ref 70–108)
HBA1C MFR BLD HPLC: 6 % (ref 4.4–6.4)
HCT VFR BLD AUTO: 48.9 % (ref 42–52)
HDLC SERPL-MCNC: 51 MG/DL
IMM GRANULOCYTES # BLD AUTO: 0.23 THOU/MM3 (ref 0–0.07)
IMM GRANULOCYTES NFR BLD AUTO: 1.7 %
LDLC SERPL CALC-MCNC: 132 MG/DL
LYMPHOCYTES ABSOLUTE: 1.7 THOU/MM3 (ref 1–4.8)
LYMPHOCYTES NFR BLD AUTO: 13 %
MCH RBC QN AUTO: 31.4 PG (ref 26–33)
MCHC RBC AUTO-ENTMCNC: 34.2 GM/DL (ref 32.2–35.5)
MCV RBC AUTO: 91.9 FL (ref 80–94)
MONOCYTES ABSOLUTE: 0.7 THOU/MM3 (ref 0.4–1.3)
MONOCYTES NFR BLD AUTO: 5.5 %
NEUTROPHILS ABSOLUTE: 10.3 THOU/MM3 (ref 1.8–7.7)
NEUTROPHILS NFR BLD AUTO: 77.7 %
NRBC BLD AUTO-RTO: 0 /100 WBC
PLATELET # BLD AUTO: 219 THOU/MM3 (ref 130–400)
PMV BLD AUTO: 11.4 FL (ref 9.4–12.4)
POTASSIUM SERPL-SCNC: 4.9 MEQ/L (ref 3.5–5.2)
PROT SERPL-MCNC: 7.6 G/DL (ref 6.1–8)
RBC # BLD AUTO: 5.32 MILL/MM3 (ref 4.7–6.1)
SODIUM SERPL-SCNC: 136 MEQ/L (ref 135–145)
TRIGL SERPL-MCNC: 283 MG/DL (ref 0–199)
TSH SERPL DL<=0.005 MIU/L-ACNC: 3.44 UIU/ML (ref 0.4–4.2)
URATE SERPL-MCNC: 7.9 MG/DL (ref 3.7–7)
WBC # BLD AUTO: 13.3 THOU/MM3 (ref 4.8–10.8)

## 2024-08-07 PROCEDURE — 80061 LIPID PANEL: CPT

## 2024-08-07 PROCEDURE — 84550 ASSAY OF BLOOD/URIC ACID: CPT

## 2024-08-07 PROCEDURE — 83036 HEMOGLOBIN GLYCOSYLATED A1C: CPT

## 2024-08-07 PROCEDURE — 85025 COMPLETE CBC W/AUTO DIFF WBC: CPT

## 2024-08-07 PROCEDURE — 80175 DRUG SCREEN QUAN LAMOTRIGINE: CPT

## 2024-08-07 PROCEDURE — 80053 COMPREHEN METABOLIC PANEL: CPT

## 2024-08-07 PROCEDURE — 84443 ASSAY THYROID STIM HORMONE: CPT

## 2024-08-07 PROCEDURE — 36415 COLL VENOUS BLD VENIPUNCTURE: CPT

## 2024-08-07 RX ORDER — AMLODIPINE BESYLATE 5 MG/1
5 TABLET ORAL DAILY
Qty: 90 TABLET | Refills: 3 | Status: SHIPPED | OUTPATIENT
Start: 2024-08-07

## 2024-08-07 SDOH — ECONOMIC STABILITY: INCOME INSECURITY: HOW HARD IS IT FOR YOU TO PAY FOR THE VERY BASICS LIKE FOOD, HOUSING, MEDICAL CARE, AND HEATING?: NOT HARD AT ALL

## 2024-08-07 SDOH — ECONOMIC STABILITY: HOUSING INSECURITY
IN THE LAST 12 MONTHS, WAS THERE A TIME WHEN YOU DID NOT HAVE A STEADY PLACE TO SLEEP OR SLEPT IN A SHELTER (INCLUDING NOW)?: NO

## 2024-08-07 SDOH — ECONOMIC STABILITY: FOOD INSECURITY: WITHIN THE PAST 12 MONTHS, THE FOOD YOU BOUGHT JUST DIDN'T LAST AND YOU DIDN'T HAVE MONEY TO GET MORE.: NEVER TRUE

## 2024-08-07 SDOH — ECONOMIC STABILITY: FOOD INSECURITY: WITHIN THE PAST 12 MONTHS, YOU WORRIED THAT YOUR FOOD WOULD RUN OUT BEFORE YOU GOT MONEY TO BUY MORE.: NEVER TRUE

## 2024-08-07 ASSESSMENT — PATIENT HEALTH QUESTIONNAIRE - PHQ9
SUM OF ALL RESPONSES TO PHQ9 QUESTIONS 1 & 2: 0
SUM OF ALL RESPONSES TO PHQ QUESTIONS 1-9: 0
SUM OF ALL RESPONSES TO PHQ QUESTIONS 1-9: 0
2. FEELING DOWN, DEPRESSED OR HOPELESS: NOT AT ALL
SUM OF ALL RESPONSES TO PHQ QUESTIONS 1-9: 0
1. LITTLE INTEREST OR PLEASURE IN DOING THINGS: NOT AT ALL
SUM OF ALL RESPONSES TO PHQ QUESTIONS 1-9: 0

## 2024-08-07 ASSESSMENT — ENCOUNTER SYMPTOMS
GASTROINTESTINAL NEGATIVE: 1
RESPIRATORY NEGATIVE: 1

## 2024-08-07 NOTE — PROGRESS NOTES
rhonchi or rales.   Abdominal:      General: There is no distension.   Musculoskeletal:      Cervical back: Neck supple.   Skin:     General: Skin is warm and dry.      Findings: No rash (on exposed surfaces).   Neurological:      General: No focal deficit present.      Mental Status: He is alert.   Psychiatric:         Attention and Perception: Attention normal.         Mood and Affect: Mood normal.         Speech: Speech normal.         Behavior: Behavior normal. Behavior is cooperative.         Thought Content: Thought content normal.         Judgment: Judgment normal.            Assessment & Plan   ASSESSMENT/PLAN:  1. Acute idiopathic gout, unspecified site  -     Uric Acid; Future  2. Essential hypertension  -     amLODIPine (NORVASC) 5 MG tablet; Take 1 tablet by mouth daily, Disp-90 tablet, R-3Normal  -     CBC with Auto Differential; Future  3. Post herpetic neuralgia  -     Lamotrigine Level; Future  4. Pure hypercholesterolemia  -     Lipid Panel w/ Reflex Direct LDL; Future  -     Comprehensive Metabolic Panel; Future  -     TSH with Reflex; Future  5. IFG (impaired fasting glucose)  -     Comprehensive Metabolic Panel; Future  -     Hemoglobin A1C; Future  6. History of nephrectomy  7. S/P MVR (mitral valve repair)    -  ER reports reviewed  -  Toe doing much better on the MDP  -  Only 2nd occurrence of gout, does not wish to start prophylactic medication at this time  -  Chronic medical problems otherwise stable  -  Continue current medications  -  Follow up with specialists as scheduled  -  Check labs, will call    Return for as needed.             An electronic signature was used to authenticate this note.    --Juan David Becker DO

## 2024-08-08 LAB — LAMOTRIGINE SERPL-MCNC: 4.4 UG/ML (ref 3–15)

## 2025-05-05 ENCOUNTER — TELEPHONE (OUTPATIENT)
Dept: FAMILY MEDICINE CLINIC | Age: 75
End: 2025-05-05

## 2025-05-05 ENCOUNTER — OFFICE VISIT (OUTPATIENT)
Dept: FAMILY MEDICINE CLINIC | Age: 75
End: 2025-05-05
Payer: MEDICARE

## 2025-05-05 VITALS
SYSTOLIC BLOOD PRESSURE: 136 MMHG | RESPIRATION RATE: 16 BRPM | BODY MASS INDEX: 26.57 KG/M2 | HEART RATE: 68 BPM | DIASTOLIC BLOOD PRESSURE: 74 MMHG | WEIGHT: 185.2 LBS

## 2025-05-05 DIAGNOSIS — G50.0 TRIGEMINAL NEURALGIA OF LEFT SIDE OF FACE: Primary | ICD-10-CM

## 2025-05-05 PROCEDURE — 3078F DIAST BP <80 MM HG: CPT | Performed by: NURSE PRACTITIONER

## 2025-05-05 PROCEDURE — 1159F MED LIST DOCD IN RCRD: CPT | Performed by: NURSE PRACTITIONER

## 2025-05-05 PROCEDURE — 1123F ACP DISCUSS/DSCN MKR DOCD: CPT | Performed by: NURSE PRACTITIONER

## 2025-05-05 PROCEDURE — 99214 OFFICE O/P EST MOD 30 MIN: CPT | Performed by: NURSE PRACTITIONER

## 2025-05-05 PROCEDURE — 3075F SYST BP GE 130 - 139MM HG: CPT | Performed by: NURSE PRACTITIONER

## 2025-05-05 SDOH — ECONOMIC STABILITY: FOOD INSECURITY: WITHIN THE PAST 12 MONTHS, YOU WORRIED THAT YOUR FOOD WOULD RUN OUT BEFORE YOU GOT MONEY TO BUY MORE.: NEVER TRUE

## 2025-05-05 SDOH — ECONOMIC STABILITY: FOOD INSECURITY: WITHIN THE PAST 12 MONTHS, THE FOOD YOU BOUGHT JUST DIDN'T LAST AND YOU DIDN'T HAVE MONEY TO GET MORE.: NEVER TRUE

## 2025-05-05 ASSESSMENT — PATIENT HEALTH QUESTIONNAIRE - PHQ9
2. FEELING DOWN, DEPRESSED OR HOPELESS: NOT AT ALL
SUM OF ALL RESPONSES TO PHQ QUESTIONS 1-9: 0
1. LITTLE INTEREST OR PLEASURE IN DOING THINGS: NOT AT ALL
SUM OF ALL RESPONSES TO PHQ QUESTIONS 1-9: 0

## 2025-05-05 ASSESSMENT — ENCOUNTER SYMPTOMS
SHORTNESS OF BREATH: 0
ABDOMINAL PAIN: 0
COUGH: 0
NAUSEA: 0

## 2025-05-05 NOTE — PROGRESS NOTES
Phone # given to patient to contact [facility] neurology Dr Vázquez's office at ph# [phone].  Patient was last seen 7/25/24 with them.  Referral faxed to [facility] at fax# [phone]

## 2025-05-05 NOTE — PROGRESS NOTES
Drake Solares (1950) 74 y.o. male here for evaluation of the following chief complaint(s):      HPI:  Chief Complaint   Patient presents with    Headache     \"Like lightening striking my head\"  Was seen in [place] and had \"nerve burnt in head\"--requesting referral back there       Hx of left trigeminal neuralgia.  Last seen NEURO/PAIN at [facility] 1 year ago with procedures and treatment that were very effective.      Symptoms are slowly coming back and he would like to be seen by them again.  Sensitivity to skin on left face, visual issues, heaviness.  Same symptoms as the past    Vitals:    05/05/25 0821   BP: 136/74   Pulse: 68   Resp: 16       Patient Active Problem List   Diagnosis    Hyperlipidemia    Hypothyroid    HTN (hypertension)    History of nephrectomy    S/P MVR (mitral valve repair)    Lipoma    Scrotal mass       SUBJECTIVE/OBJECTIVE:  Review of Systems   Constitutional:  Negative for chills and fever.   HENT: Negative.     Eyes:  Positive for visual disturbance.   Respiratory:  Negative for cough and shortness of breath.    Cardiovascular:  Negative for chest pain.   Gastrointestinal:  Negative for abdominal pain and nausea.   Skin:  Negative for rash.   Neurological:  Positive for numbness and headaches. Negative for dizziness and light-headedness.   Psychiatric/Behavioral: Negative.         Physical Exam  Constitutional:       General: He is not in acute distress.  Eyes:      Pupils: Pupils are equal, round, and reactive to light.   Cardiovascular:      Rate and Rhythm: Normal rate and regular rhythm.      Heart sounds: No murmur heard.  Pulmonary:      Effort: Pulmonary effort is normal.      Breath sounds: Normal breath sounds. No wheezing.   Abdominal:      General: Bowel sounds are normal. There is no distension.      Palpations: Abdomen is soft.      Tenderness: There is no abdominal tenderness.   Musculoskeletal:         General: No tenderness. Normal range of motion.      Cervical

## 2025-05-05 NOTE — TELEPHONE ENCOUNTER
Patient called in. Stated that Colin Louise CNP had told him he would not need  a referral to [facility], since he previously was referred there. However, when patient called, they stated he would need a referral. Notified patient that a referral had been placed and they would contact him for scheduling. Patient verbalized understanding.

## 2025-05-08 ENCOUNTER — HOSPITAL ENCOUNTER (OUTPATIENT)
Dept: INTERVENTIONAL RADIOLOGY/VASCULAR | Age: 75
Discharge: HOME | End: 2025-05-10

## 2025-05-08 ENCOUNTER — RESULTS FOLLOW-UP (OUTPATIENT)
Dept: FAMILY MEDICINE CLINIC | Age: 75
End: 2025-05-08

## 2025-05-08 DIAGNOSIS — Z13.6 ENCOUNTER FOR SCREENING FOR VASCULAR DISEASE: ICD-10-CM

## 2025-05-08 PROCEDURE — 9900000021 US VASCULAR SCREENING

## 2025-08-12 ENCOUNTER — OFFICE VISIT (OUTPATIENT)
Dept: FAMILY MEDICINE CLINIC | Age: 75
End: 2025-08-12
Payer: MEDICARE

## 2025-08-12 VITALS
WEIGHT: 177 LBS | BODY MASS INDEX: 25.34 KG/M2 | RESPIRATION RATE: 18 BRPM | SYSTOLIC BLOOD PRESSURE: 124 MMHG | DIASTOLIC BLOOD PRESSURE: 70 MMHG | HEIGHT: 70 IN | HEART RATE: 68 BPM

## 2025-08-12 DIAGNOSIS — R73.01 IFG (IMPAIRED FASTING GLUCOSE): ICD-10-CM

## 2025-08-12 DIAGNOSIS — M10.9 GOUTY ARTHRITIS OF GREAT TOE: Primary | ICD-10-CM

## 2025-08-12 DIAGNOSIS — E78.00 PURE HYPERCHOLESTEROLEMIA: ICD-10-CM

## 2025-08-12 PROCEDURE — 1159F MED LIST DOCD IN RCRD: CPT | Performed by: NURSE PRACTITIONER

## 2025-08-12 PROCEDURE — 3078F DIAST BP <80 MM HG: CPT | Performed by: NURSE PRACTITIONER

## 2025-08-12 PROCEDURE — 3074F SYST BP LT 130 MM HG: CPT | Performed by: NURSE PRACTITIONER

## 2025-08-12 PROCEDURE — 1123F ACP DISCUSS/DSCN MKR DOCD: CPT | Performed by: NURSE PRACTITIONER

## 2025-08-12 PROCEDURE — 99213 OFFICE O/P EST LOW 20 MIN: CPT | Performed by: NURSE PRACTITIONER

## 2025-08-12 RX ORDER — PREDNISONE 20 MG/1
20 TABLET ORAL 2 TIMES DAILY
Qty: 8 TABLET | Refills: 0 | Status: SHIPPED | OUTPATIENT
Start: 2025-08-12 | End: 2025-08-16

## 2025-08-12 ASSESSMENT — ENCOUNTER SYMPTOMS
SHORTNESS OF BREATH: 0
NAUSEA: 0
COUGH: 0
ABDOMINAL PAIN: 0

## 2025-08-13 ENCOUNTER — TELEPHONE (OUTPATIENT)
Dept: FAMILY MEDICINE CLINIC | Age: 75
End: 2025-08-13

## (undated) DEVICE — GLOVE ORANGE PI 7   MSG9070

## (undated) DEVICE — GLOVE ORANGE PI 8   MSG9080

## (undated) DEVICE — Device

## (undated) DEVICE — SUPPORT SCROT L AD L39-44IN SFT KNIT PCH SUSP WAISTBAND

## (undated) DEVICE — SPONGE GZ W4XL4IN COT 12 PLY TYP VII WVN C FLD DSGN

## (undated) DEVICE — TRAY PREP DRY W/ PREM GLV 2 APPL 6 SPNG 2 UNDPD 1 OVERWRAP

## (undated) DEVICE — SPONGE LAP W18XL18IN WHT COT 4 PLY FLD STRUNG RADPQ DISP ST

## (undated) DEVICE — COVER ARMBRD W13XL28.5IN IMPERV BLU FOR OP RM

## (undated) DEVICE — SYRINGE MED 10ML LUERLOCK TIP W/O SFTY DISP

## (undated) DEVICE — HYPODERMIC SAFETY NEEDLE: Brand: MAGELLAN